# Patient Record
Sex: MALE | Race: WHITE | Employment: FULL TIME | URBAN - NONMETROPOLITAN AREA
[De-identification: names, ages, dates, MRNs, and addresses within clinical notes are randomized per-mention and may not be internally consistent; named-entity substitution may affect disease eponyms.]

---

## 2018-06-28 ENCOUNTER — OFFICE VISIT (OUTPATIENT)
Dept: PRIMARY CARE CLINIC | Age: 58
End: 2018-06-28
Payer: COMMERCIAL

## 2018-06-28 VITALS
WEIGHT: 272 LBS | TEMPERATURE: 98.7 F | BODY MASS INDEX: 36.84 KG/M2 | OXYGEN SATURATION: 97 % | DIASTOLIC BLOOD PRESSURE: 100 MMHG | SYSTOLIC BLOOD PRESSURE: 150 MMHG | HEIGHT: 72 IN | HEART RATE: 72 BPM

## 2018-06-28 DIAGNOSIS — K04.7 DENTAL ABSCESS: Primary | ICD-10-CM

## 2018-06-28 PROCEDURE — 99213 OFFICE O/P EST LOW 20 MIN: CPT | Performed by: NURSE PRACTITIONER

## 2018-06-28 RX ORDER — PENICILLIN V POTASSIUM 500 MG/1
500 TABLET ORAL 4 TIMES DAILY
Qty: 28 TABLET | Refills: 0 | Status: SHIPPED | OUTPATIENT
Start: 2018-06-28 | End: 2018-07-05

## 2018-06-28 ASSESSMENT — PATIENT HEALTH QUESTIONNAIRE - PHQ9
SUM OF ALL RESPONSES TO PHQ QUESTIONS 1-9: 0
1. LITTLE INTEREST OR PLEASURE IN DOING THINGS: 0
2. FEELING DOWN, DEPRESSED OR HOPELESS: 0
SUM OF ALL RESPONSES TO PHQ9 QUESTIONS 1 & 2: 0

## 2018-06-28 ASSESSMENT — ENCOUNTER SYMPTOMS
GASTROINTESTINAL NEGATIVE: 1
RESPIRATORY NEGATIVE: 1

## 2019-08-04 ENCOUNTER — APPOINTMENT (OUTPATIENT)
Dept: CT IMAGING | Age: 59
End: 2019-08-04
Payer: COMMERCIAL

## 2019-08-04 ENCOUNTER — HOSPITAL ENCOUNTER (EMERGENCY)
Age: 59
Discharge: HOME | End: 2019-08-04
Attending: EMERGENCY MEDICINE
Payer: COMMERCIAL

## 2019-08-04 VITALS — RESPIRATION RATE: 26 BRPM | OXYGEN SATURATION: 98 % | HEART RATE: 106 BPM

## 2019-08-04 DIAGNOSIS — S01.01XA LACERATION OF SCALP, INITIAL ENCOUNTER: Primary | ICD-10-CM

## 2019-08-04 DIAGNOSIS — S01.81XA FACIAL LACERATION, INITIAL ENCOUNTER: ICD-10-CM

## 2019-08-04 PROCEDURE — 70450 CT HEAD/BRAIN W/O DYE: CPT

## 2019-08-04 PROCEDURE — 99283 EMERGENCY DEPT VISIT LOW MDM: CPT

## 2019-08-04 PROCEDURE — 96372 THER/PROPH/DIAG INJ SC/IM: CPT

## 2019-08-04 PROCEDURE — 6360000002 HC RX W HCPCS: Performed by: EMERGENCY MEDICINE

## 2019-08-04 PROCEDURE — 12002 RPR S/N/AX/GEN/TRNK2.6-7.5CM: CPT

## 2019-08-04 RX ORDER — HALOPERIDOL 5 MG/ML
5 INJECTION INTRAMUSCULAR ONCE
Status: COMPLETED | OUTPATIENT
Start: 2019-08-04 | End: 2019-08-04

## 2019-08-04 RX ORDER — LORAZEPAM 2 MG/ML
1 INJECTION INTRAMUSCULAR ONCE
Status: COMPLETED | OUTPATIENT
Start: 2019-08-04 | End: 2019-08-04

## 2019-08-04 RX ORDER — HALOPERIDOL 5 MG/ML
10 INJECTION INTRAMUSCULAR ONCE
Status: COMPLETED | OUTPATIENT
Start: 2019-08-04 | End: 2019-08-04

## 2019-08-04 RX ADMIN — HALOPERIDOL LACTATE 5 MG: 5 INJECTION, SOLUTION INTRAMUSCULAR at 17:29

## 2019-08-04 RX ADMIN — HALOPERIDOL LACTATE 5 MG: 5 INJECTION, SOLUTION INTRAMUSCULAR at 17:40

## 2019-08-04 RX ADMIN — LORAZEPAM 1 MG: 2 INJECTION INTRAMUSCULAR; INTRAVENOUS at 17:22

## 2019-08-04 RX ADMIN — HALOPERIDOL LACTATE 5 MG: 5 INJECTION, SOLUTION INTRAMUSCULAR at 17:23

## 2019-08-04 NOTE — ED NOTES
Patient calmer and willing to have CT scan done if he can have ice water when done     Tod Mccallum RN  08/04/19 4614

## 2019-08-04 NOTE — ED NOTES
Patient continues to be resistant to care. Patient verbally threatening ED staff and police. Stating \"I won't be drunk forever\", \"I'm gonna get out of this bed\", \"I hope you have a will\". Verbal order received from Dr. Chepe Lawrence to administer additional 5mg of Haldol IM.      Russ Fairbanks RN  08/04/19 2022

## 2019-08-04 NOTE — ED NOTES
Given ice water as requested. Patient calmer and not verbally abusive as before.      Sylvester Chen RN  08/04/19 6577

## 2019-08-04 NOTE — ED PROVIDER NOTES
1 capsule by mouth daily. SERTRALINE (ZOLOFT) 100 MG TABLET    Take 1.5 tablets by mouth daily. SIMVASTATIN (ZOCOR) 40 MG TABLET    Take 1 tablet by mouth daily. VENLAFAXINE (EFFEXOR-XR) 75 MG XR CAPSULE    Take 1 capsule by mouth daily. ALLERGIES     has No Known Allergies. FAMILY HISTORY     He indicated that his mother is alive. He indicated that his father is . He indicated that only one of his two sisters is alive. He indicated that both of his brothers are alive. He indicated that his maternal grandmother is . He indicated that his maternal grandfather is . He indicated that his paternal grandmother is . He indicated that his paternal grandfather is . He indicated that his daughter is alive. He indicated that both of his sons are alive. family history includes Cancer in his sister; Coronary Art Dis in his father; Diabetes in his maternal grandmother; Glaucoma in his mother; Heart Attack in his father; Other in his father; Stroke in his maternal grandmother; Sudden Death in his sister. SOCIAL HISTORY      reports that he quit smoking about 7 years ago. His smoking use included cigarettes. He has a 24.75 pack-year smoking history. He has never used smokeless tobacco. He reports that he does not drink alcohol or use drugs. PHYSICAL EXAM     INITIAL VITALS:  pulse is 106. His respiration is 26 and oxygen saturation is 98%. Patient is alert and combative. HEENT:  3 cm lac to right parietal scalp. 1 cm lac to left forehead. Pupils are PERRL at 4 mm with normal extraocular motion. No nystagmus. Mucous membranes moist.    Neck is supple with no lymphadenopathy. No JVD. No meningismus. Heart sounds regular rate and rhythm with no gallops, murmurs, or rubs. Lungs clear, no wheezes, rales or rhonchi. Abdomen: soft, nontender. Musculoskeletal exam shows no evidence of trauma, other than to scalp.   Normal distal pulses in all extremities. Skin: no rash or edema. Neurological exam reveals cranial nerves 2 through 12 grossly intact. Patient has equal  and normal deep tendon reflexes. Psychiatric: no hallucinations or suicidal ideation. Lymphatics.:  No lymphadenopathy. DIFFERENTIAL DIAGNOSIS/ MDM:     Laceration, inebriation, ICH    DIAGNOSTIC RESULTS     RADIOLOGY:   I directly visualized the following  images and reviewed the radiologist interpretations:  CT Head WO Contrast   Final Result   Motion degradation of images. No acute intracranial abnormality. Right frontoparietal scalp staples. CT Head WO Contrast (Final result)   Result time 08/04/19 18:18:23   Final result by Altagracia Charles MD (08/04/19 18:18:23)                Impression:    Motion degradation of images. No acute intracranial abnormality. Right frontoparietal scalp staples. Narrative:    EXAMINATION:  CT OF THE HEAD WITHOUT CONTRAST  8/4/2019 5:57 pm    TECHNIQUE:  CT of the head was performed without the administration of intravenous  contrast. Dose modulation, iterative reconstruction, and/or weight based  adjustment of the mA/kV was utilized to reduce the radiation dose to as low  as reasonably achievable. COMPARISON:  None. HISTORY:  ORDERING SYSTEM PROVIDED HISTORY: trauma  TECHNOLOGIST PROVIDED HISTORY:    Reason for Exam: Patient banged head multiple times against  car windows  while being transferred. Laceration right top of head  Acuity: Acute  Type of Exam: Initial    FINDINGS:  There is motion degradation of images. BRAIN/VENTRICLES: There is no acute intracranial hemorrhage, mass effect or  midline shift.  No abnormal extra-axial fluid collection.  The gray-white  differentiation is maintained without evidence of an acute infarct.  There is  no evidence of hydrocephalus. ORBITS: The visualized portion of the orbits demonstrate no acute abnormality.     SINUSES: The visualized paranasal sinuses and mastoid air cells demonstrate  no acute abnormality. SOFT TISSUES/SKULL:  No acute abnormality of the visualized skull or soft  tissues.  Note is made of right frontal scalp staples. EMERGENCY DEPARTMENT COURSE:   Vitals:    Vitals:    08/04/19 1800   Pulse: 106   Resp: 26   SpO2: 98%     -------------------------   ,  , Pulse: 106, Resp: 26      Re-evaluation Notes    The patient received a total of 15 mg of Haldol and 1 mg of Ativan. After this, the patient was more docile and we were able to get a CT scan to clear him of intracerebral hemorrhage. The patient's wounds were cleansed and repaired. The patient is medically cleared for incarceration. He is discharged into the custody of police in stable condition. PROCEDURES:    laceration repair:  The patient's lacerations were cleansed with soap and water. They were explored. No deep structures were involved. The wound on the parietal scalp was closed with 6 staples. The wound on the forehead was closed with Dermabond. FINAL IMPRESSION      1. Laceration of scalp, initial encounter    2.  Facial laceration, initial encounter          DISPOSITION/PLAN   DISPOSITION        Condition on Disposition    good    PATIENT REFERRED TO:  Dixon Hassan MD  [address] [place] [address] Minoo Moody  [phone]    In 10 days  for staple removal      DISCHARGE MEDICATIONS:  New Prescriptions    No medications on file       (Please note that portions of this note were completed with a voice recognition program.  Efforts were made to edit the dictations but occasionally words are mis-transcribed.)    Crouch MD   Attending Emergency Physician         Piotr Velarde MD  08/04/19 2727

## 2021-04-22 RX ORDER — LANOLIN ALCOHOL/MO/W.PET/CERES
2 CREAM (GRAM) TOPICAL
COMMUNITY

## 2021-04-22 RX ORDER — TAMSULOSIN HYDROCHLORIDE 0.4 MG/1
0.4 CAPSULE ORAL EVERY EVENING
COMMUNITY

## 2021-04-22 RX ORDER — ASPIRIN 81 MG/1
81 TABLET ORAL
COMMUNITY

## 2021-04-22 RX ORDER — NAPROXEN 500 MG/1
500 TABLET ORAL 2 TIMES DAILY WITH MEALS
COMMUNITY
End: 2022-03-18

## 2021-04-22 RX ORDER — LORAZEPAM 0.5 MG/1
0.5 TABLET ORAL DAILY
COMMUNITY
End: 2021-05-24

## 2021-05-24 ENCOUNTER — HOSPITAL ENCOUNTER (OUTPATIENT)
Dept: GENERAL RADIOLOGY | Age: 61
Discharge: HOME | End: 2021-05-26
Payer: COMMERCIAL

## 2021-05-24 ENCOUNTER — HOSPITAL ENCOUNTER (OUTPATIENT)
Dept: NON INVASIVE DIAGNOSTICS | Age: 61
Discharge: HOME | End: 2021-05-24
Payer: COMMERCIAL

## 2021-05-24 ENCOUNTER — INITIAL CONSULT (OUTPATIENT)
Dept: SURGERY | Age: 61
End: 2021-05-24
Payer: COMMERCIAL

## 2021-05-24 ENCOUNTER — HOSPITAL ENCOUNTER (OUTPATIENT)
Dept: LAB | Age: 61
Discharge: HOME | End: 2021-05-24
Payer: COMMERCIAL

## 2021-05-24 VITALS
RESPIRATION RATE: 18 BRPM | SYSTOLIC BLOOD PRESSURE: 130 MMHG | TEMPERATURE: 98.5 F | HEIGHT: 72 IN | WEIGHT: 222.2 LBS | BODY MASS INDEX: 30.1 KG/M2 | DIASTOLIC BLOOD PRESSURE: 88 MMHG | HEART RATE: 88 BPM

## 2021-05-24 DIAGNOSIS — Z01.818 PRE-OP TESTING: ICD-10-CM

## 2021-05-24 DIAGNOSIS — K40.90 UNILATERAL INGUINAL HERNIA WITHOUT OBSTRUCTION OR GANGRENE, RECURRENCE NOT SPECIFIED: Primary | ICD-10-CM

## 2021-05-24 LAB
ABSOLUTE EOS #: 0.23 K/UL (ref 0–0.44)
ABSOLUTE IMMATURE GRANULOCYTE: 0.04 K/UL (ref 0–0.3)
ABSOLUTE LYMPH #: 2.71 K/UL (ref 1.1–3.7)
ABSOLUTE MONO #: 1.13 K/UL (ref 0.1–1.2)
ALBUMIN SERPL-MCNC: 4.7 G/DL (ref 3.5–5.2)
ALBUMIN/GLOBULIN RATIO: 1.8 (ref 1–2.5)
ALP BLD-CCNC: 45 U/L (ref 40–129)
ALT SERPL-CCNC: 18 U/L (ref 5–41)
ANION GAP SERPL CALCULATED.3IONS-SCNC: 8 MMOL/L (ref 9–17)
AST SERPL-CCNC: 32 U/L
BASOPHILS # BLD: 0 % (ref 0–2)
BASOPHILS ABSOLUTE: 0.05 K/UL (ref 0–0.2)
BILIRUB SERPL-MCNC: 0.49 MG/DL (ref 0.3–1.2)
BUN BLDV-MCNC: 21 MG/DL (ref 6–20)
BUN/CREAT BLD: 24 (ref 9–20)
CALCIUM SERPL-MCNC: 9.5 MG/DL (ref 8.6–10.4)
CHLORIDE BLD-SCNC: 105 MMOL/L (ref 98–107)
CO2: 28 MMOL/L (ref 20–31)
CREAT SERPL-MCNC: 0.86 MG/DL (ref 0.7–1.2)
DIFFERENTIAL TYPE: ABNORMAL
EKG ATRIAL RATE: 65 BPM
EKG P AXIS: 77 DEGREES
EKG P-R INTERVAL: 194 MS
EKG Q-T INTERVAL: 410 MS
EKG QRS DURATION: 118 MS
EKG QTC CALCULATION (BAZETT): 426 MS
EKG R AXIS: 52 DEGREES
EKG T AXIS: 45 DEGREES
EKG VENTRICULAR RATE: 65 BPM
EOSINOPHILS RELATIVE PERCENT: 2 % (ref 1–4)
GFR AFRICAN AMERICAN: >60 ML/MIN
GFR NON-AFRICAN AMERICAN: >60 ML/MIN
GFR SERPL CREATININE-BSD FRML MDRD: ABNORMAL ML/MIN/{1.73_M2}
GFR SERPL CREATININE-BSD FRML MDRD: ABNORMAL ML/MIN/{1.73_M2}
GLUCOSE BLD-MCNC: 84 MG/DL (ref 70–99)
HCT VFR BLD CALC: 45.1 % (ref 40.7–50.3)
HEMOGLOBIN: 14.8 G/DL (ref 13–17)
IMMATURE GRANULOCYTES: 0 %
LYMPHOCYTES # BLD: 22 % (ref 24–43)
MCH RBC QN AUTO: 30.6 PG (ref 25.2–33.5)
MCHC RBC AUTO-ENTMCNC: 32.8 G/DL (ref 25.2–33.5)
MCV RBC AUTO: 93.2 FL (ref 82.6–102.9)
MONOCYTES # BLD: 9 % (ref 3–12)
NRBC AUTOMATED: 0 PER 100 WBC
PDW BLD-RTO: 14 % (ref 11.8–14.4)
PLATELET # BLD: 246 K/UL (ref 138–453)
PLATELET ESTIMATE: ABNORMAL
PMV BLD AUTO: 10.5 FL (ref 8.1–13.5)
POTASSIUM SERPL-SCNC: 4.1 MMOL/L (ref 3.7–5.3)
RBC # BLD: 4.84 M/UL (ref 4.21–5.77)
RBC # BLD: ABNORMAL 10*6/UL
SEG NEUTROPHILS: 67 % (ref 36–65)
SEGMENTED NEUTROPHILS ABSOLUTE COUNT: 8.46 K/UL (ref 1.5–8.1)
SODIUM BLD-SCNC: 141 MMOL/L (ref 135–144)
TOTAL PROTEIN: 7.3 G/DL (ref 6.4–8.3)
WBC # BLD: 12.6 K/UL (ref 3.5–11.3)
WBC # BLD: ABNORMAL 10*3/UL

## 2021-05-24 PROCEDURE — 85025 COMPLETE CBC W/AUTO DIFF WBC: CPT

## 2021-05-24 PROCEDURE — 80053 COMPREHEN METABOLIC PANEL: CPT

## 2021-05-24 PROCEDURE — 36415 COLL VENOUS BLD VENIPUNCTURE: CPT

## 2021-05-24 PROCEDURE — 93005 ELECTROCARDIOGRAM TRACING: CPT

## 2021-05-24 PROCEDURE — 71046 X-RAY EXAM CHEST 2 VIEWS: CPT

## 2021-05-24 PROCEDURE — 99214 OFFICE O/P EST MOD 30 MIN: CPT | Performed by: SURGERY

## 2021-05-24 NOTE — PROGRESS NOTES
Types: Cigarettes     Quit date: 3/1/2012     Years since quittin.2    Smokeless tobacco: Never Used   Substance and Sexual Activity    Alcohol use: No    Drug use: No    Sexual activity: Not on file   Other Topics Concern    Not on file   Social History Narrative    Not on file     Social Determinants of Health     Financial Resource Strain:     Difficulty of Paying Living Expenses:    Food Insecurity:     Worried About Running Out of Food in the Last Year:     Ran Out of Food in the Last Year:    Transportation Needs:     Lack of Transportation (Medical):  Lack of Transportation (Non-Medical):    Physical Activity:     Days of Exercise per Week:     Minutes of Exercise per Session:    Stress:     Feeling of Stress :    Social Connections:     Frequency of Communication with Friends and Family:     Frequency of Social Gatherings with Friends and Family:     Attends Confucianist Services:     Active Member of Clubs or Organizations:     Attends Club or Organization Meetings:     Marital Status:    Intimate Partner Violence:     Fear of Current or Ex-Partner:     Emotionally Abused:     Physically Abused:     Sexually Abused:      Past Surgical History:   Procedure Laterality Date    COLONOSCOPY  2013    With EGD with biopsies and polypectomies showing severe esophagitis with ulceration, gastritis with antral erosions, hiatal hernia and small rectal polyp.  EYE SURGERY Left     KNEE ARTHROSCOPY Left     Meniscus repair, [address] [facility] [address] Gloria Jolly.  MEATOTOMY      For meatal stenosis.  UVULECTOMY      WISDOM TOOTH EXTRACTION       Past Medical History:   Diagnosis Date    Adult situational stress disorder     Anger     Issues.     Depression with anxiety     History of tobacco abuse     Hyperlipidemia     Hypertension     YESICA (obstructive sleep apnea)     cpap noncompliace    Osteoarthritis     back and knees    Suicide attempt [facility]) Age 26 Current Outpatient Medications on File Prior to Visit   Medication Sig Dispense Refill    aspirin 81 MG EC tablet Take 81 mg by mouth      glucosamine-chondroitin 500-400 MG tablet Take 2 tablets by mouth      tamsulosin (FLOMAX) 0.4 MG capsule Take 0.4 mg by mouth every evening      naproxen (NAPROSYN) 500 MG tablet Take 500 mg by mouth 2 times daily (with meals)      hydrochlorothiazide (HYDRODIURIL) 25 MG tablet TAKE ONE TABLET BY MOUTH ONCE DAILY 90 tablet 0    simvastatin (ZOCOR) 40 MG tablet Take 1 tablet by mouth daily. 90 tablet 1    lamoTRIgine (LAMICTAL) 25 MG tablet Take 1 tablet by mouth 2 times daily. 180 tablet 1    omeprazole (PRILOSEC) 40 MG capsule Take 1 capsule by mouth daily. 90 capsule 1    sertraline (ZOLOFT) 100 MG tablet Take 1.5 tablets by mouth daily. 135 tablet 1    venlafaxine (EFFEXOR-XR) 75 MG XR capsule Take 1 capsule by mouth daily. 90 capsule 1     No current facility-administered medications on file prior to visit. Allergies as of 05/24/2021    (No Known Allergies)     PHYSICAL EXAM:    Blood pressure 130/88, pulse 88, temperature 98.5 °F (36.9 °C), temperature source Tympanic, resp. rate 18, height 6' (1.829 m), weight 222 lb 3.2 oz (100.8 kg). Gen:  A and O x 3, NAD, well nourished  Eyes:  Sclera non icterus, PERRL  Lungs:  CTA, symmetrical  Chest:  RRR, no murmurs  Abd:  Soft, NT, ND, no HSM, no bruits  Hernia:   Patient has a moderate size hernia on the left inguinal area. It is reducible. It does go down into the upper scrotum. It feels like an indirect. There is no hernia on the right and there is no hernia at the umbilicus    ASSESS MENT:    1. Left ing hernia, reducible        PLAN:    1.   Lap ing hernia repair, TEP

## 2021-05-24 NOTE — LETTER
[facility]         Patient:John Kathy Hodgkin           :1960           Surgical/Procedure Planned: laparoscopic left inguinal hernia repair with mesh    Date & Location: 2021 at [facility]       Outpatient   Planned Length of OR: 1 hour    Sedation: general      Estimated Cardiac Risk for Non-Cardiac Surgery/Procedure     Low______ Moderate______ High______    Medication Instructions - Clarification needed by this date:       ASA 81 mg Hold ___ Days      Resume medications:     Lovenox indicated: _____Yes _____NO    Provider:Dr. Carolina Zelaya of Provider Giving Orders for Medication holds:    _____________________________________________

## 2021-06-07 ENCOUNTER — TELEPHONE (OUTPATIENT)
Dept: SURGERY | Age: 61
End: 2021-06-07

## 2021-07-01 ENCOUNTER — OFFICE VISIT (OUTPATIENT)
Dept: SURGERY | Age: 61
End: 2021-07-01
Payer: COMMERCIAL

## 2021-07-01 VITALS
HEART RATE: 68 BPM | BODY MASS INDEX: 31.29 KG/M2 | DIASTOLIC BLOOD PRESSURE: 60 MMHG | HEIGHT: 72 IN | SYSTOLIC BLOOD PRESSURE: 110 MMHG | TEMPERATURE: 98.7 F | WEIGHT: 231 LBS

## 2021-07-01 DIAGNOSIS — Z09 POSTOP CHECK: ICD-10-CM

## 2021-07-01 PROCEDURE — 99024 POSTOP FOLLOW-UP VISIT: CPT | Performed by: SURGERY

## 2022-02-08 ENCOUNTER — HOSPITAL ENCOUNTER (OUTPATIENT)
Dept: NEUROLOGY | Age: 62
Discharge: HOME | End: 2022-02-08
Payer: COMMERCIAL

## 2022-02-08 PROCEDURE — 95886 MUSC TEST DONE W/N TEST COMP: CPT

## 2022-02-08 PROCEDURE — 95912 NRV CNDJ TEST 11-12 STUDIES: CPT

## 2022-02-08 NOTE — PROGRESS NOTES
EMG/NCS Bilateral    lower Completed    PCP: RY Juarez    Ordering: RY Blair    Interpreting Physician: Charla Webb.  Juani Upton, Neurologist    Technician: Ryan Deleon RN

## 2022-02-08 NOTE — PROCEDURES
[address]                 [address] [place] [address]                        EMG/NERVE CONDUCTION STUDIES REPORT        PATIENT NAME: Daryle Stains                       :        1960  MED REC NO:   [id number]                             ROOM:  ACCOUNT NO:   [de-identified]                           ADMIT DATE: 2022    PROVIDER:     Ricky Carrion MD      DATE OF EM2022    REFERRING PROVIDER:  RY Torres      TECHNICAL SUMMARY:  The nature, purpose, goals, expectations and process  involved in the procedures of nerve conduction studies and needle  electromyography were reviewed, discussed, explained and verbal consent  was obtained from the patient. The patient's questions were answered  with reference to the above processes and procedures. There were no significant technical difficulties encountered during this  study and nerve conduction studies were performed under temperature  monitoring. CLINICAL DATA:        The patient is 63years old with history of tingling,  numbness, paresthesias involving both feet, legs, achiness involving the  lower extremities for the last 2 years. The patient also feels chronic  lower back pain. The patient has history of polyneuropathy for the last  4 to 5 years. The purpose of the study is to evaluate for mononeuropathy,  radiculopathy, peripheral polyneuropathy. SUMMARY:      The sensory nerve action potentials of the right and left  sural and superficial peroneal nerves were not recordable bilaterally. Compound muscle action potentials of the right and left peroneal and  tibial nerve shows low amplitude with borderline conduction velocities. Peroneal motor conduction studies recorded at tibialis anterior shows  borderline to normal amplitude, normal conduction velocities  bilaterally.     Proximal conductions as measured by the F responses were not recordable  in both peroneal and tibial nerves bilaterally. Tibial H responses were not recordable. Nerve  Conductions   Results  Were  Personally  Reviewed and  Analysed. Abnormal  Nerve  Conductions  Were  Personally  Repeated,  Verified, reconfirmed  And  Updated as needed  appropriately. Please    See   Wave  Forms   And    Details  Of     Nerve  Conduction   Studies   For  Additional  Information             Extensive   Needle  Electromyography  Was personally  Performed  In  Both      Lower  Extremities    In  The  Following  Muscles :      Gluteus  Medius,   Vastus  Lateralis,  Internal  Hamstring,    External  Hamstring,   Anterior Tibialis,  Medial  Gastrocnemius,           Extensive  Needle  Electromyography  Shows     A) Normal  insertional  Activity. There  Is  Absence  Of   P  Waves,  Fibrillations,  Fasciculations and        Other  Spontaneous   Activity. B) Voluntary  Motor unit  Potentials    Show :    Normal  Effort. Decreased   Recruitment, Increased amplitude &  Duration. Polyphasic features  Noted  In  The  Above  Examined  muscles           IMPRESSION:      1. There is electrodiagnostic evidence of moderate, sensory motor,        peripheral polyneuropathy involving examined both lower extremities. 2.  There is electrodiagnostic evidence of chronic L4-L5 and L5-S1       radiculopathy bilaterally. 3.  There is no definite electrodiagnostic evidence of inflammatory        myopathy involving examined both lower extremities. Further clinical correlation and followup recommended. Daniel Weir MD, Hector Michelle     Board Certified in Neurology  & in  [facility] ([facility]).            D: 02/08/2022 10:00:04       T: 02/08/2022 10:15:25     [id number]  Job#: [id number]     Doc#: [id number]    CC:  Jann Charles

## 2022-03-18 ENCOUNTER — OFFICE VISIT (OUTPATIENT)
Dept: NEUROSURGERY | Age: 62
End: 2022-03-18
Payer: COMMERCIAL

## 2022-03-18 VITALS
SYSTOLIC BLOOD PRESSURE: 116 MMHG | BODY MASS INDEX: 32.37 KG/M2 | HEART RATE: 72 BPM | WEIGHT: 239 LBS | DIASTOLIC BLOOD PRESSURE: 72 MMHG | RESPIRATION RATE: 16 BRPM | OXYGEN SATURATION: 96 % | HEIGHT: 72 IN

## 2022-03-18 DIAGNOSIS — G62.9 PERIPHERAL POLYNEUROPATHY: Primary | ICD-10-CM

## 2022-03-18 PROCEDURE — 99213 OFFICE O/P EST LOW 20 MIN: CPT | Performed by: NURSE PRACTITIONER

## 2022-03-18 PROCEDURE — 99203 OFFICE O/P NEW LOW 30 MIN: CPT | Performed by: NURSE PRACTITIONER

## 2022-03-18 NOTE — PROGRESS NOTES
[address]  [address] [facility]  [address] [facility], [address]  [facility] [address]  Dept: [phone]    Patient:  Cande Quezada  YOB: 1960  Date: 3/18/22    The patient is a 63 y.o. male who presents today for consult of the following problems:     Chief Complaint   Patient presents with    New Patient    Back Pain     lower, both legs tingle         HPI:     Cande Quezada is a 63 y.o. male on whom neurosurgical consultation was requested by RY Grullon for management of back and leg pain. Pt has had issues with pain, numbness and tingling to both legs and feet. This has been present for the last 5+ years. Painful numbness and tingling is present distally to lower extremities, anterior mid-calf into soles of feet, with involvement of all toes. Patient does have vascular discoloration to distal lower extremities, as well as known venous insufficiency. Follows with vascular specialist, regularly utilizes compression stockings which do help overall symptoms. Presents today for review of MRI as well as recent EMG. Groin pain: No  Saddle anesthesia: No  Hip Pain: No  Bowel/bladder incontinence: No  Constipation or Urinary retention: No    LIMITATIONS    Pain significantly limiting from a daily standpoint. Assistive devices: none  Daily pharmacologic pain control include: none  Back versus leg: all leg    MANAGEMENT     Prior Surgery: No  Prior to 1yr ago: In the last year:    Physical Therapy: Yes   Chiropractic Interventions: Yes   Injections: No;  Improvement: n/a    Types of injections/responses: n/a    History:     Past Medical History:   Diagnosis Date    Adult situational stress disorder     Anger     Issues.     Depression with anxiety     History of tobacco abuse     Hyperlipidemia     Hypertension     YESICA (obstructive sleep apnea)     cpap noncompliace    Osteoarthritis back and knees    Suicide attempt [facility]) Age 26     Past Surgical History:   Procedure Laterality Date    COLONOSCOPY  03/21/2013    With EGD with biopsies and polypectomies showing severe esophagitis with ulceration, gastritis with antral erosions, hiatal hernia and small rectal polyp.  EYE SURGERY Left     INGUINAL HERNIA REPAIR  06/16/2021    lap left ing [id number]    KNEE ARTHROSCOPY Left     Meniscus repair, [address] [facility], [address] Aime Gonzalez.  MEATOTOMY      For meatal stenosis.  UVULECTOMY      WISDOM TOOTH EXTRACTION       Family History   Problem Relation Age of Onset    Other Father         Paralyzed.  Heart Attack Father         Myocardial infarction.  Coronary Art Dis Father     Stroke Maternal Grandmother     Diabetes Maternal Grandmother     Cancer Sister         Unknown type.  Glaucoma Mother    Hernandez Sudden Death Sister         shot by  during divorce     Current Outpatient Medications on File Prior to Visit   Medication Sig Dispense Refill    aspirin 81 MG EC tablet Take 81 mg by mouth      glucosamine-chondroitin 500-400 MG tablet Take 2 tablets by mouth      tamsulosin (FLOMAX) 0.4 MG capsule Take 0.4 mg by mouth every evening      hydrochlorothiazide (HYDRODIURIL) 25 MG tablet TAKE ONE TABLET BY MOUTH ONCE DAILY 90 tablet 0    simvastatin (ZOCOR) 40 MG tablet Take 1 tablet by mouth daily. 90 tablet 1    lamoTRIgine (LAMICTAL) 25 MG tablet Take 1 tablet by mouth 2 times daily. 180 tablet 1    omeprazole (PRILOSEC) 40 MG capsule Take 1 capsule by mouth daily. 90 capsule 1    sertraline (ZOLOFT) 100 MG tablet Take 1.5 tablets by mouth daily. 135 tablet 1    venlafaxine (EFFEXOR-XR) 75 MG XR capsule Take 1 capsule by mouth daily. 90 capsule 1     No current facility-administered medications on file prior to visit.      Social History     Tobacco Use    Smoking status: Current Every Day Smoker     Packs/day: 1.00     Years: 40.00     Pack years: 40.00     Types: Cigarettes     Start date: 1/1/1982    Smokeless tobacco: Never Used   Substance Use Topics    Alcohol use: No    Drug use: No       No Known Allergies    Review of Systems  Constitutional: Negative for activity change and appetite change. HENT: Negative for ear pain and facial swelling. Eyes: Negative for discharge and itching. Respiratory: Negative for choking and chest tightness. Cardiovascular: Negative for chest pain and leg swelling. Gastrointestinal: Negative for nausea and abdominal pain. Endocrine: Negative for cold intolerance and heat intolerance. Genitourinary: Negative for frequency and flank pain. Musculoskeletal: Negative for myalgias and joint swelling. Skin: Negative for rash and wound. Allergic/Immunologic: Negative for environmental allergies and food allergies. Hematological: Negative for adenopathy. Does not bruise/bleed easily. Psychiatric/Behavioral: Negative for self-injury. The patient is not nervous/anxious. Physical Exam:      /72 (Site: Left Upper Arm, Position: Sitting, Cuff Size: Large Adult)   Pulse 72   Resp 16   Ht 6' (1.829 m)   Wt 239 lb (108.4 kg)   SpO2 96%   BMI 32.41 kg/m²   Estimated body mass index is 32.41 kg/m² as calculated from the following:    Height as of this encounter: 6' (1.829 m). Weight as of this encounter: 239 lb (108.4 kg). General:  Gavin Lee is a 63y.o. year old male who appears his stated age. HEENT: Normocephalic atraumatic. Neck supple. Chest: regular rate; pulses equal  Abdomen: Soft nontender nondistended.    Ext: DP and PT pulses 2+, good cap refill  Neuro    Mentation  Appropriate affect  Registration intact  Orientation intact    Cranial Nerves:   Pupils equal and reactive to light  Extraocular motion intact  Face and shrug symmetric  Tongue midline  No dysarthria  v1-3 sensation symmetric, masseter tone symmetric  Hearing symmetric and intact    Sensation: Decreased sensation bilateral shins and feet    Motor  L deltoid 5/5; R deltoid 5/5  L biceps 5/5; R biceps 5/5  L triceps 5/5; R triceps 5/5  L wrist extension 5/5; R wrist extension 5/5  L intrinsics 5/5; R intrinsics 5/5     L iliopsoas 5/5 , R iliopsoas 5/5  L quadriceps 5/5; R quadriceps 5/5  L Dorsiflexion 5/5; R dorsiflexion 5/5  L Plantarflexion 5/5; R plantarflexion 5/5  L EHL 5/5; R EHL 5/5    Reflexes  L Brachioradialis 2+/4; R brachioradialis 2+/4  L Biceps 2+/4; R Biceps 2+/4  L Triceps 2+/4; R Triceps 2+/4  L Patellar 2+/4: R Patellar 2+/4  L Achilles 2+/4; R Achilles 2+/4    hoffmans L: neg  hoffmans R: neg  Clonus L: neg  Clonus R: neg  Babinski L: neg  Babinski R: neg    YASH: Negative  Straight leg raise: Negative  SI joint tenderness: Negative    Studies Review:     EMG 2/8/2022:  IMPRESSION:    1. There is electrodiagnostic evidence of moderate, sensory motor,         peripheral polyneuropathy involving examined both lower extremities.     2. There is electrodiagnostic evidence of chronic L4-L5 and L5-S1        radiculopathy bilaterally.     3. There is no definite electrodiagnostic evidence of inflammatory         myopathy involving examined both lower extremities. Vascular surgery notes reviewed    Assessment and Plan:      1. Peripheral polyneuropathy          Plan: Symptoms most consistent with polyneuropathy. Recommend evaluation by neurology. Continue to follow with vascular specialist as well, use of compression stockings and elevation as recommended. Follow-up after neurology eval.    Followup: Return in about 3 months (around 6/18/2022), or if symptoms worsen or fail to improve. Prescriptions Ordered:  No orders of the defined types were placed in this encounter.        Orders Placed:  Orders Placed This Encounter   Procedures   Jose Francisco Salas MD, Neurology, [place]     Referral Priority:   Routine     Referral Type:   Eval and Treat     Referral Reason:   Specialty Services Required     Referred to Provider:   Eve Blanton MD     Requested Specialty:   Neurology     Number of Visits Requested:   1        Electronically signed by ONEYDA Olvera CNP on 3/18/2022 at 11:15 AM    Please note that this chart was generated using voice recognition Dragon dictation software. Although every effort was made to ensure the accuracy of this automated transcription, some errors in transcription may have occurred.

## 2022-05-09 ENCOUNTER — HOSPITAL ENCOUNTER (OUTPATIENT)
Dept: GENERAL RADIOLOGY | Age: 62
Discharge: HOME | End: 2022-05-11
Payer: COMMERCIAL

## 2022-05-09 ENCOUNTER — HOSPITAL ENCOUNTER (OUTPATIENT)
Dept: LAB | Age: 62
Discharge: HOME | End: 2022-05-09
Payer: COMMERCIAL

## 2022-05-09 ENCOUNTER — OFFICE VISIT (OUTPATIENT)
Dept: NEUROLOGY | Age: 62
End: 2022-05-09
Payer: COMMERCIAL

## 2022-05-09 VITALS
BODY MASS INDEX: 31.56 KG/M2 | OXYGEN SATURATION: 97 % | HEART RATE: 100 BPM | DIASTOLIC BLOOD PRESSURE: 84 MMHG | HEIGHT: 72 IN | WEIGHT: 233 LBS | SYSTOLIC BLOOD PRESSURE: 126 MMHG

## 2022-05-09 DIAGNOSIS — M79.2 NEUROPATHIC PAIN: ICD-10-CM

## 2022-05-09 DIAGNOSIS — E78.2 MIXED HYPERLIPIDEMIA: ICD-10-CM

## 2022-05-09 DIAGNOSIS — R27.8 SENSORY ATAXIA: ICD-10-CM

## 2022-05-09 DIAGNOSIS — F41.8 DEPRESSION WITH ANXIETY: ICD-10-CM

## 2022-05-09 DIAGNOSIS — Z91.81 H/O FALL: ICD-10-CM

## 2022-05-09 DIAGNOSIS — R20.0 NUMBNESS AND TINGLING OF BOTH LEGS BELOW KNEES: ICD-10-CM

## 2022-05-09 DIAGNOSIS — I10 PRIMARY HYPERTENSION: ICD-10-CM

## 2022-05-09 DIAGNOSIS — R20.2 NUMBNESS AND TINGLING OF BOTH LEGS BELOW KNEES: ICD-10-CM

## 2022-05-09 DIAGNOSIS — F17.200 SMOKER: ICD-10-CM

## 2022-05-09 DIAGNOSIS — G60.9 IDIOPATHIC PERIPHERAL NEUROPATHY: Primary | ICD-10-CM

## 2022-05-09 DIAGNOSIS — J44.9 CHRONIC OBSTRUCTIVE PULMONARY DISEASE, UNSPECIFIED COPD TYPE (HCC): ICD-10-CM

## 2022-05-09 DIAGNOSIS — G60.9 IDIOPATHIC PERIPHERAL NEUROPATHY: ICD-10-CM

## 2022-05-09 LAB
ALBUMIN SERPL-MCNC: 4.8 G/DL (ref 3.5–5.2)
ALBUMIN/GLOBULIN RATIO: 1.7 (ref 1–2.5)
ALP BLD-CCNC: 55 U/L (ref 40–129)
ALT SERPL-CCNC: 16 U/L (ref 5–41)
ANION GAP SERPL CALCULATED.3IONS-SCNC: 11 MMOL/L (ref 9–17)
AST SERPL-CCNC: 25 U/L
BILIRUB SERPL-MCNC: 0.51 MG/DL (ref 0.3–1.2)
BUN BLDV-MCNC: 16 MG/DL (ref 6–20)
BUN/CREAT BLD: 20 (ref 9–20)
CALCIUM SERPL-MCNC: 9.5 MG/DL (ref 8.6–10.4)
CHLORIDE BLD-SCNC: 104 MMOL/L (ref 98–107)
CO2: 27 MMOL/L (ref 20–31)
CREAT SERPL-MCNC: 0.81 MG/DL (ref 0.7–1.2)
FOLATE: >20 NG/ML
GFR AFRICAN AMERICAN: >60 ML/MIN
GFR NON-AFRICAN AMERICAN: >60 ML/MIN
GFR SERPL CREATININE-BSD FRML MDRD: ABNORMAL ML/MIN/{1.73_M2}
GLUCOSE BLD-MCNC: 104 MG/DL (ref 70–99)
HCT VFR BLD CALC: 48.2 % (ref 40.7–50.3)
HEMOGLOBIN: 16.1 G/DL (ref 13–17)
MCH RBC QN AUTO: 30.7 PG (ref 25.2–33.5)
MCHC RBC AUTO-ENTMCNC: 33.4 G/DL (ref 25.2–33.5)
MCV RBC AUTO: 92 FL (ref 82.6–102.9)
NRBC AUTOMATED: 0 PER 100 WBC
PDW BLD-RTO: 14.4 % (ref 11.8–14.4)
PLATELET # BLD: 242 K/UL (ref 138–453)
PMV BLD AUTO: 10.3 FL (ref 8.1–13.5)
POTASSIUM SERPL-SCNC: 3.8 MMOL/L (ref 3.7–5.3)
RBC # BLD: 5.24 M/UL (ref 4.21–5.77)
SODIUM BLD-SCNC: 142 MMOL/L (ref 135–144)
T. PALLIDUM, IGG: NONREACTIVE
TOTAL PROTEIN: 7.6 G/DL (ref 6.4–8.3)
TSH SERPL DL<=0.05 MIU/L-ACNC: 3.68 UIU/ML (ref 0.3–5)
VITAMIN B-12: 1225 PG/ML (ref 232–1245)
WBC # BLD: 8.8 K/UL (ref 3.5–11.3)

## 2022-05-09 PROCEDURE — 85027 COMPLETE CBC AUTOMATED: CPT

## 2022-05-09 PROCEDURE — 86780 TREPONEMA PALLIDUM: CPT

## 2022-05-09 PROCEDURE — 36415 COLL VENOUS BLD VENIPUNCTURE: CPT

## 2022-05-09 PROCEDURE — 99205 OFFICE O/P NEW HI 60 MIN: CPT | Performed by: PSYCHIATRY & NEUROLOGY

## 2022-05-09 PROCEDURE — 84165 PROTEIN E-PHORESIS SERUM: CPT

## 2022-05-09 PROCEDURE — 83516 IMMUNOASSAY NONANTIBODY: CPT

## 2022-05-09 PROCEDURE — 84155 ASSAY OF PROTEIN SERUM: CPT

## 2022-05-09 PROCEDURE — 86255 FLUORESCENT ANTIBODY SCREEN: CPT

## 2022-05-09 PROCEDURE — 80053 COMPREHEN METABOLIC PANEL: CPT

## 2022-05-09 PROCEDURE — 82746 ASSAY OF FOLIC ACID SERUM: CPT

## 2022-05-09 PROCEDURE — 71046 X-RAY EXAM CHEST 2 VIEWS: CPT

## 2022-05-09 PROCEDURE — 84443 ASSAY THYROID STIM HORMONE: CPT

## 2022-05-09 PROCEDURE — 82607 VITAMIN B-12: CPT

## 2022-05-09 RX ORDER — OXCARBAZEPINE 150 MG/1
TABLET, FILM COATED ORAL
Qty: 60 TABLET | Refills: 0 | Status: SHIPPED | OUTPATIENT
Start: 2022-05-09 | End: 2022-06-20 | Stop reason: SDUPTHER

## 2022-05-09 ASSESSMENT — ENCOUNTER SYMPTOMS
CHOKING: 0
ABDOMINAL PAIN: 0
PHOTOPHOBIA: 0
SINUS PRESSURE: 0
NAUSEA: 0
DIARRHEA: 0
EYE REDNESS: 0
COUGH: 0
CHEST TIGHTNESS: 0
WHEEZING: 0
ABDOMINAL DISTENTION: 0
BLOOD IN STOOL: 0
COLOR CHANGE: 0
VOICE CHANGE: 0
VOMITING: 0
VISUAL CHANGE: 0
EYE ITCHING: 0
EYE PAIN: 0
BACK PAIN: 1
TROUBLE SWALLOWING: 0
SORE THROAT: 0
SHORTNESS OF BREATH: 0
CONSTIPATION: 0
FACIAL SWELLING: 0
APNEA: 0
EYE DISCHARGE: 0

## 2022-05-09 NOTE — PROGRESS NOTES
[facility]  Neurology    [address] [place] [address]  SNXFD:[phone]   Fax: [phone]        SUBJECTIVE:       PATIENT ID:  Pablo Boogie is a  RIGHT    HANDED 63 y.o. male. Neurologic Problem  The patient's primary symptoms include clumsiness, focal sensory loss and a loss of balance. The patient's pertinent negatives include no focal weakness, memory loss, near-syncope, syncope, visual change or weakness. Primary symptoms comment: PERIPHERAL  POLYNEUROPATHY  AND  CHRONIC   LUMBAR  PAIN    . This is a chronic problem. The neurological problem developed insidiously. Associated symptoms include back pain and light-headedness. Pertinent negatives include no abdominal pain, chest pain, confusion, dizziness, fatigue, fever, headaches, nausea, neck pain, palpitations, shortness of breath or vomiting. Past treatments include bed rest and sleep. The treatment provided no relief. His past medical history is significant for mood changes. There is no history of a bleeding disorder, a clotting disorder, a CVA, dementia, head trauma, liver disease or seizures. History obtained from  The   PATIENT         and other  available   medical  records   were  Also  reviewed. The  Duration,  Quality,  Severity,  Location,  Timing,  Context,  Modifying  Factors   Of   The   Chief   Complaint       And  Present  Illness  Was   Reviewed   In   Chronological   Manner. PATIENT'S  MAIN  CONCERNS INCLUDE :                     1)     H/O   CHRONIC  PERIPHERAL  POLYNEUROPATHY                                     BOTH LOWER  EXTREMITIES    FOR  MORE   THAN    3   YEARS                        2)    NO    H/O    DM.                            3)   PREVIOUS      H/O      ALCOHOL    ABUSE                                        QUIT  ALCOHOL   FOR   3   YEARS                 4)    H/O     CHRONIC  SMOKING                    PATIENT  AWARE  OF RISKS  AND                 SIDE  EFFECTS  OF   SMOKING   DISCUSSED. PATIENT   ADVISED   AND  COUNSELED    TO   QUIT  SMOKING. 5)      H/O   CHRONIC  ANXIETY,  DEPRESSION                                              FOR    5 - 6    YEARS                            -  ON LAMICTAL,  ZOLOFT ,   EFFEXOR                    6)     CO   MORBID  MEDICAL  CONDITIONS                              BEING  FOLLOWED  BY   HIS PCP                       7)       PATIENT    FACTORY     WORKER       FOR    MORE  THAN   30   YEARS                               INVOLVING  PROLONGED   STANDING                      8)      H/O   CHRONIC  LUMBAR  PAIN    FOR    10    YEARS                        9)     EMG /  NC  STUDIES  IN   FEB. 2022     SHOWED                           A)   MODERATE  PERIPHERAL  POLYNEUROPATHY                          B)    CHRONIC  LOWER  LUMBAR  RADICULOPATHY                                              10)     PREVIOUS      H/O    FALL    -   UN EXPLAINED                        11)      SENSORY   ATAXIA                            12)    AS   DISCUSSED    WITH  PATIENT                               PATIENT  WILL   BE  TRIED  WITH    TRILEPTAL                              EXPECTATIONS,   GOALS   AND  SIDE  EFFECTS  MEDICATION    WERE                                 REVIEWED     AND   DISCUSSED    IN    DETAIL. 13)      IN  VIEW  OF  THE  PATIENT'S    MULTIPLE   NEUROLOGICAL                           SYMPTOMS  AND  CONCERNS    FOR  PROLONGED   DURATION,                           AND    MULTIPLE   CO MORBID  MEDICAL  CONDITIONS,                           PATIENT    NEEDS  NEURO  DIAGNOSTIC  EVALUATIONS                      FOR   ANY   NEUROLOGICAL  PATHOLOGIES    AND  OTHER                        CORRECTABLE   ETIOLOGIES;     AND                              PATIENT  REQUESTS   THE  SAME. 14)      VARIOUS  RISK   FACTORS   WERE  REVIEWED   AND   DISCUSSED. PATIENT   HAS  MULTIPLE   MEDICAL, NEUROLOGICAL                        AND   MENTAL HEALTH   PROBLEMS .                                                PRECIPITATING  FACTORS: including  fever/infection, exertion/relaxation, position change, stress,                weather change,   medications/alcohol, time of day/darkness/light  Are  present                                                          MODIFYING  FACTORS:  fever/infection, exertion/relaxation, position change, stress, weather change,               medications/alcohol, time of day/darkness/light  Are  present                Patient   Indicates   The  Presence   And  The  Absence  Of  The  Following    Associated  And             Additional  Neurological    Symptoms:                                Balance  And coordination   problems  present           Gait problems     absent            Headaches      absent              Migraines           absent           Memory problemsabsent              Confusion        absent            Paresthesia   numbness          absent           Seizures  And  Starring  Episodes           absent           Syncope,  Near  syncopal episodes         absent           Speech   problems           absent             Swallowing   Problems      absent            Dizziness,  Light headedness           absent              Vertigo        absent             Generalized   Weakness    absent              focal  Weakness     absent             Tremors         absent              Sleep  Problems     absent             History  Of   Recent  Head  Injury     absent             History  Of   Recent  TIA     absent             History  Of   Recent    Stroke     absent             Neck  Pain   and   Neck muscle  Spasms  absent               Radiating  down   And   Weakness           absent            Lower back   Pain  And     Spasms  present Radiating    Down   And   Weakness          present                H/OFALLS        present               History  Of   Visual  Symptoms    absent                  Associated   Diplopia       absent                                               Also   Additional   Symptoms   Present    As  Documented    In   The   detailed                  Review  Of  Systems   And    Please   Refer   To    Them for   Additional    Information. Any components  That are either  Unobtainable  Or  Limited  In   HPI, ROS  And/or PFSH   Are                   Due   ToPatient's  Medical  Problems,  Clinical  Condition   and/or lack of                                 other    Alternate   resources. RECORDS   REVIEWED:    historical medical records           INFORMATION   REVIEWED:     MEDICAL   HISTORY,SURGICAL   HISTORY,     MEDICATIONS   LIST,   ALLERGIES AND  DRUG  INTOLERANCES,       FAMILY   HISTORY,  SOCIAL  HISTORY,      PROBLEM  LIST   FOR  PATIENT  CARE   COORDINATION          Past Medical History:   Diagnosis Date    Adult situational stress disorder     Anger     Issues.  Depression with anxiety     History of tobacco abuse     Hyperlipidemia     Hypertension     YESICA (obstructive sleep apnea)     cpap noncompliace    Osteoarthritis     back and knees    Suicide attempt [facility] Age 26         Past Surgical History:   Procedure Laterality Date    COLONOSCOPY  03/21/2013    With EGD with biopsies and polypectomies showing severe esophagitis with ulceration, gastritis with antral erosions, hiatal hernia and small rectal polyp.  EYE SURGERY Left     INGUINAL HERNIA REPAIR  06/16/2021    lap left ing [id number]    KNEE ARTHROSCOPY Left     Meniscus repair, [address], Dr. Violet Oswald.  MEATOTOMY      For meatal stenosis.     UVULECTOMY      WISDOM TOOTH EXTRACTION           Current Outpatient Medications   Medication Sig Dispense Refill    OXcarbazepine (TRILEPTAL) 150 MG tablet ONE  TABLET      AT  BED   TIME   FOR     10   DAYS,     THEN      ONE  TABLET  TWICE  DAILY 60 tablet 0    aspirin 81 MG EC tablet Take 81 mg by mouth      glucosamine-chondroitin 500-400 MG tablet Take 2 tablets by mouth      tamsulosin (FLOMAX) 0.4 MG capsule Take 0.4 mg by mouth every evening      hydrochlorothiazide (HYDRODIURIL) 25 MG tablet TAKE ONE TABLET BY MOUTH ONCE DAILY 90 tablet 0    simvastatin (ZOCOR) 40 MG tablet Take 1 tablet by mouth daily. 90 tablet 1    lamoTRIgine (LAMICTAL) 25 MG tablet Take 1 tablet by mouth 2 times daily. 180 tablet 1    omeprazole (PRILOSEC) 40 MG capsule Take 1 capsule by mouth daily. 90 capsule 1    sertraline (ZOLOFT) 100 MG tablet Take 1.5 tablets by mouth daily. 135 tablet 1    venlafaxine (EFFEXOR-XR) 75 MG XR capsule Take 1 capsule by mouth daily. 90 capsule 1     No current facility-administered medications for this visit. No Known Allergies      Family History   Problem Relation Age of Onset    Other Father         Paralyzed.  Heart Attack Father         Myocardial infarction.  Coronary Art Dis Father     Stroke Maternal Grandmother     Diabetes Maternal Grandmother     Cancer Sister         Unknown type.     Glaucoma Mother    Kortney Maurer Sudden Death Sister         shot by  during divorce         Social History     Socioeconomic History    Marital status:      Spouse name: Not on file    Number of children: Not on file    Years of education: Not on file    Highest education level: Not on file   Occupational History    Not on file   Tobacco Use    Smoking status: Current Every Day Smoker     Packs/day: 1.00     Years: 40.00     Pack years: 40.00     Types: Cigarettes     Start date: 1/1/1982    Smokeless tobacco: Never Used   Substance and Sexual Activity    Alcohol use: No    Drug use: No    Sexual activity: Not on file   Other Topics Concern    Not on file   Social History Narrative    Not on file     Social Determinants of Health     Financial Resource Strain:     Difficulty of Paying Living Expenses: Not on file   Food Insecurity:     Worried About Running Out of Food in the Last Year: Not on file    Belkys of Food in the Last Year: Not on file   Transportation Needs:     Lack of Transportation (Medical): Not on file    Lack of Transportation (Non-Medical):  Not on file   Physical Activity:     Days of Exercise per Week: Not on file    Minutes of Exercise per Session: Not on file   Stress:     Feeling of Stress : Not on file   Social Connections:     Frequency of Communication with Friends and Family: Not on file    Frequency of Social Gatherings with Friends and Family: Not on file    Attends Oriental orthodox Services: Not on file    Active Member of [address] [place] [address] [employer] or Organizations: Not on file    Attends Club or Organization Meetings: Not on file    Marital Status: Not on file   Intimate Partner Violence:     Fear of Current or Ex-Partner: Not on file    Emotionally Abused: Not on file    Physically Abused: Not on file    Sexually Abused: Not on file   Housing Stability:     Unable to Pay for Housing in the Last Year: Not on file    Number of [place] in the Last Year: Not on file    Unstable Housing in the Last Year: Not on file       Vitals:    05/09/22 0946   BP: 126/84   Pulse: 100   SpO2: 97%         Wt Readings from Last 3 Encounters:   05/09/22 233 lb (105.7 kg)   03/18/22 239 lb (108.4 kg)   07/01/21 231 lb (104.8 kg)         BP Readings from Last 3 Encounters:   05/09/22 126/84   03/18/22 116/72   07/01/21 110/60           Hematology and Coagulation    Lab Results   Component Value Date    WBC 12.6 05/24/2021    RBC 4.84 05/24/2021    HGB 14.8 05/24/2021    HCT 45.1 05/24/2021    MCV 93.2 05/24/2021    MCH 30.6 05/24/2021    MCHC 32.8 05/24/2021    RDW 14.0 05/24/2021     05/24/2021    MPV 10.5 05/24/2021       No results found for: ESR    Chemistries    Lab Results Component Value Date     05/24/2021    K 4.1 05/24/2021     05/24/2021    CO2 28 05/24/2021    BUN 21 05/24/2021    CREATININE 0.86 05/24/2021    CALCIUM 9.5 05/24/2021    PROT 7.3 05/24/2021    LABALBU 4.7 05/24/2021    BILITOT 0.49 05/24/2021    ALKPHOS 45 05/24/2021    AST 32 05/24/2021    ALT 18 05/24/2021     Lab Results   Component Value Date    ALKPHOS 45 05/24/2021    ALT 18 05/24/2021    AST 32 05/24/2021    PROT 7.3 05/24/2021    BILITOT 0.49 05/24/2021    LABALBU 4.7 05/24/2021     Lab Results   Component Value Date    BUN 21 05/24/2021    CREATININE 0.86 05/24/2021     Lab Results   Component Value Date    CALCIUM 9.5 05/24/2021     Lab Results   Component Value Date    AST 32 05/24/2021    ALT 18 05/24/2021           Review of Systems   Constitutional: Negative for appetite change, chills, fatigue, fever and unexpected weight change. HENT: Negative for congestion, dental problem, drooling, ear discharge, ear pain, facial swelling, hearing loss, mouth sores, nosebleeds, postnasal drip, sinus pressure, sore throat, tinnitus, trouble swallowing and voice change. Eyes: Negative for photophobia, pain, discharge, redness, itching and visual disturbance. Respiratory: Negative for apnea, cough, choking, chest tightness, shortness of breath and wheezing. Cardiovascular: Negative for chest pain, palpitations, leg swelling and near-syncope. Gastrointestinal: Negative for abdominal distention, abdominal pain, blood in stool, constipation, diarrhea, nausea and vomiting. Endocrine: Negative for cold intolerance, heat intolerance, polydipsia, polyphagia and polyuria. Musculoskeletal: Positive for back pain. Negative for arthralgias, gait problem, joint swelling, myalgias, neck pain and neck stiffness. Skin: Negative for color change, pallor, rash and wound. Allergic/Immunologic: Negative for environmental allergies, food allergies and immunocompromised state.    Neurological: Positive for light-headedness and loss of balance. Negative for dizziness, tremors, focal weakness, seizures, syncope, facial asymmetry, speech difficulty, weakness, numbness and headaches. Hematological: Negative for adenopathy. Does not bruise/bleed easily. Psychiatric/Behavioral: Negative for agitation, behavioral problems, confusion, decreased concentration, dysphoric mood, hallucinations, memory loss, self-injury, sleep disturbance and suicidal ideas. The patient is nervous/anxious. The patient is not hyperactive. OBJECTIVE:      Physical Exam  Constitutional:       Appearance: He is well-developed. HENT:      Head: Normocephalic and atraumatic. No raccoon eyes or Reynoso's sign. Right Ear: External ear normal.      Left Ear: External ear normal.      Nose: Nose normal.   Eyes:      Conjunctiva/sclera: Conjunctivae normal.      Pupils: Pupils are equal, round, and reactive to light. Neck:      Thyroid: No thyroid mass or thyromegaly. Vascular: No carotid bruit. Trachea: No tracheal deviation. Meningeal: Brudzinski's sign and Kernig's sign absent. Cardiovascular:      Rate and Rhythm: Normal rate and regular rhythm. Pulmonary:      Effort: Pulmonary effort is normal.   Musculoskeletal:         General: No tenderness. Cervical back: Normal range of motion and neck supple. No rigidity. No muscular tenderness. Normal range of motion. Skin:     General: Skin is warm. Coloration: Skin is not pale. Findings: No erythema or rash. Nails: There is no clubbing. Psychiatric:         Attention and Perception: He is attentive. Mood and Affect: Mood is anxious. Mood is not depressed. Affect is not labile, blunt or inappropriate. Speech: He is communicative. Speech is not rapid and pressured, delayed, slurred or tangential.         Behavior: Behavior is not agitated, slowed, aggressive, withdrawn, hyperactive or combative. Behavior is cooperative. Thought Content: Thought content is not paranoid or delusional. Thought content does not include homicidal or suicidal ideation. Thought content does not include homicidal or suicidal plan. Cognition and Memory: Memory is not impaired. He does not exhibit impaired recent memory or impaired remote memory. Judgment: Judgment is not impulsive or inappropriate. NEUROLOGICALEXAMINATION :       A) MENTAL STATUS:                   Alert and  oriented  To time, place  And  Person. No Aphasia. No  Dysarthria. Able   To  Follow     SIMPLE    commands   without   Any  Difficulty. No right  To left confusion. Normal  Speech  And language function. Insight and  Judgment ,Fund  Of  Knowledge   within normal limits                Recent  And  Remote memory  within   normal limits                Attention &  Concentration are within   normal limits                                                   B) CRANIAL NERVES :        CN : Visual  Acuity  And  Visual fields  within normal limits               Fundi  Could  Not  Be  Could  Not  Be  Evaluated. 3,4,6 CN : Both  Pupils are   Reactive and  Equal.  Movements  Are  Intact. No  Nystagmus. No  ANTWAN. No  Afferent  Pupillary  Defect noted. 5 CN :  Normal  Facial sensations and Corneal  Reflexes           7 CN:  Normal  Facial  Symmetry  And  Strength. No facial  Weakness. 8 CN :  Hearing  Appears within normal limits          9, 10 CN: Normal   spontaneous, reflex   palate   movements         11 CN:   Normal  Shoulder  shrug and  strength         12 CN :   Normal  Tongue movements and  Tongue  In midline                        No tongue   Fasciculations or atrophy       C) MOTOR  EXAM:                 Strength  In upper  And  Lower   extremities   within   normal limits               No  Drift.      No Atrophy               Rapid   alternating  And  repetitions  Movements  within   normal limits                 Muscle  Tone  In upper  And  Lower  Extremities  normal                No rigidity. No  Spasticity. Bradykinesia   absent                 No  Asterixis. Sustention  Tremor , Resting   Tremor   absent                    No   other  Abnormal  Movements noted           D) SENSORY :               Light   touch, pinprick,   position  And  Vibration     DECREASED                          BELOW  KNEE  LEVELS   BILATERALLY        E) REFLEXES:                   Deep  Tendon  Reflexes   DECREASED                   No  pathological  Reflexes  Bilaterally. F) COORDINATION  AND  GAIT :                                Station and  Gait  normal                              Romberg 's test   POSITIVE                            Ataxia negative      ASSESSMENT:      Patient Active Problem List   Diagnosis    Hypertension    Hyperlipidemia    Depression with anxiety    YESICA (obstructive sleep apnea)    Osteoarthritis    Smoker    Idiopathic peripheral neuropathy    Numbness and tingling of both legs below knees    Neuropathic pain    Chronic obstructive pulmonary disease (HCC)    Sensory ataxia    H/O fall           VISITING DIAGNOSIS:      ICD-10-CM    1. Idiopathic peripheral neuropathy  G60.9 CBC     Comprehensive Metabolic Panel     TSH     Vitamin B12 & Folate     T. pallidum Ab     MISCELLANEOUS TESTING     Electrophoresis Protein, Serum   2. Depression with anxiety  F41.8    3. Mixed hyperlipidemia  E78.2    4. Primary hypertension  I10    5. Smoker  F17.200 XR CHEST STANDARD (2 VW)   6. Numbness and tingling of both legs below knees  R20.0 CBC    R20.2 Comprehensive Metabolic Panel     TSH     Vitamin B12 & Folate     T. pallidum Ab     MISCELLANEOUS TESTING     Electrophoresis Protein, Serum   7.  Neuropathic pain  M79.2 CBC     Comprehensive Metabolic Panel     TSH     Vitamin B12 & Folate     T. pallidum Ab     MISCELLANEOUS TESTING     Electrophoresis Protein, Serum   8. Chronic obstructive pulmonary disease, unspecified COPD type ([facility] [address]  J44.9    9. Sensory ataxia  R27.8    10. H/O fall  Z91.81                 CONCERNS   &   INCREASED   RISK   FOR         *  MONONEUROPATHIES,   RADICULOPATHY        *    BALANCE PROBLMES   AND  FALL                VARIOUS  RISK   FACTORS   WERE  REVIEWED   AND   DISCUSSED. *  PATIENT   HAS  MULTIPLE   MEDICAL, NEUROLOGICAL                        AND   MENTAL HEALTH   PROBLEMS . PATIENT'S   MANAGEMENT  IS  CHALLENGING. PLAN:                         Prashanth Mehta  Of  The  Diagnoses,  The  Management & Treatment  Options            AND    Care  plan  Were          Reviewed and   Discussed   With  patient. * Goals  And  Expectations  Of  The  Therapy  Discussed   And  Reviewed. *   Benefits   And   Side  Effect  Profile  Of  Medication/s   Were   Discussed                * Need   For  Further   Follow up For  The  Various  Problems Were  discussed. * Results  Of  The  Previous  Diagnostic tests were reviewed and  discussed                   Medical  Decision  Making  Was  Made  Based on the   Complexity  Of  Above  Mentioned  Diagnoses,    Data reviewed             And    Risk  Of  Significant   Co morbidities and   complicating   Factors. Medical  Decision  Was     High     Complexity   Due   To  The  Patient's  Multiple  Symptoms  &  Disease,            Complex  Treatment  Regimen,  Multiple medications           and   Risk  Of   Side  Effects,  Difficulty  In  Medication  Management  And  Diagnostic  Challenges           In  View  Of  The  Associated   Co  Morbid  Conditions   And  Problems. * FALL   PRECAUTIONS.       THESE  REVIEWED   AND  DISCUSSED      *    AVOID PROLONGED     STANDING      *     MAY   REST        AT    WORK AS   NEEDED          *   BE  CAREFUL  WITH  ACTIVITIES          *   AVOID   NECK  AND/ BACK  STRAINING  ACTIVITIES              *   ADEQUATE   FLUID  INTAKE   AND  ELECTROLYTE  BALANCE           * KEEP  DAIRY  OF   THE  NEUROLOGICAL  SYMPTOMS        RECORDING THE    DURATION  AND  FREQUENCY. *TO  MAINTAIN  REGULAR  SLEEP  WAKE  CYCLES. *   TO  HAVE  ADEQUATE  REST  AND   SLEEP    HOURS.              *    AVOID  ANY USAGE OF    TOBACCO,          AVOID  EXCESSIVE  ALCOHOL  AND   ILLEGAL   SUBSTANCES          *  CONTINUE   MEDICATIONS    PRESCRIBED       AS    RECOMMENDED       *   Compliance   With  Medications   And  Instructions          *    Antiplatelet  therapy    As   Recommended  Was   Discussed      *    Prophylactic  Use   Of     Vitamin   B   Complex,  Folic  Acid,    Vitamin  B12    Multivitamin,       Calcium  With  magnesium  And  Vit D    Supplementations   Over  The  Counter  Discussed             *FOOT  CARE, DAILY  INSPECTION  OF  FEET   AND         PERIODIC  PODIATRY EVALUATIONS . *   IN  VIEW  OF  THE  PATIENT'S    MULTIPLE   NEUROLOGICAL                           SYMPTOMS  AND  CONCERNS    FOR  PROLONGED   DURATION,                           AND    MULTIPLE   CO MORBID  MEDICAL  CONDITIONS,                           PATIENT    NEEDS  NEURO  DIAGNOSTIC  EVALUATIONS                      FOR   ANY   NEUROLOGICAL  PATHOLOGIES    AND  OTHER                        CORRECTABLE   ETIOLOGIES;     AND                              PATIENT  REQUESTS   THE  SAME. Controlled Substances Monitoring: Periodic Controlled Substance Monitoring: Possible medication side effects, risk of tolerance/dependence & alternative treatments discussed. ,Assessed functional status.  Renny Lezama MD)                          Orders Placed This Encounter   Procedures    XR CHEST STANDARD (2 VW)    CBC    Comprehensive Metabolic Panel    TSH    Vitamin B12 & Folate    T. pallidum Ab    MISCELLANEOUS TESTING    Electrophoresis Protein, Serum                             Orders Placed This Encounter   Medications    OXcarbazepine (TRILEPTAL) 150 MG tablet     Sig: ONE  TABLET      AT  BED   TIME   FOR     10   DAYS,     THEN      ONE  TABLET  TWICE  DAILY     Dispense:  60 tablet     Refill:  0                             *  PATIENT  TO  RETURN  THE  CLINIC  AFTER   ABOVE,                       FOR   FURTHER    RE EVALUATIONS                               AND  ADDITIONAL  RECOMMENDATIONS          *PATIENT   TO  FOLLOW  UP  WITH   PRIMARY  CARE         OTHER  CONSULTANTS  AS  BEFORE.               *TO  FOLLOW  WITH   MENTAL  HEALTH  PROFESSIONALS ,  INCLUDING            PSYCHOLOGICAL  COUNSELING   AND  PSYCHIATRIC  EVALUTIONS,                     *  Maintain   Healthy  Life Style    With   Periodic  Monitoring  Of          Any  Medical  Conditions  Including   Elevated  Blood  Pressure,  Lipid  Profile,        Blood  Sugar levels  AndHeart  Disease. *   Period   Screening  For  Cancers  Involving  Breast,  Colon,         Lungs  And  Other  Organs  As  Applicable,  In consultation   With  Your  Primary Care Providers. *Second  Neurological  Opinion  And  Evaluations  In  [facility]  Setting  If  Patient  Is  Interested. * Please   Contact   Neurology  Clinic   Early   If   Are  Any  New  Neurological   And  Any neurological  Concerns. *  If  The  Patient remains  Neurologically  Stable   Return   To  [facility] Neurology Department   IN      1-2        MONTHS  TIME   FOR  FURTHER              FOLLOW UP.                       *   The  Neurological   Findings,  Possible  Diagnosis,  Differential diagnoses                    And  Options  For    Further   Investigations                   And  management   Are  Discussed  Comprehensively. Medications   And  Prescription   Risks  And  Side effects  Are   Also  Discussed. *  If   There is  Any  Significant  Worsening   Of  Current  Symptoms  And  Or                  If patient  Develops   Any additional  New  NeurologicalSymptoms                  Or  Significant  Concerns   Should  Call  911 or  Go  To  Emergency  Department                  For  Further  Immediate  Evaluation and  management . The   Above  Were  Reviewed  With  PATIENT   and                          questions  Answered  In  Detail. TOTAL   TIME     SPENT :               On this date 5/9/2022 I have spent  60  minutes  reviewing previous notes, test results               and face to face time with the patient including     discussing the diagnosis and importance of compliance               with the treatment plan  as well as documenting on the day of the visit. Electronically signed by   Angel Luis Moffett M.D., Reggie Edwards. Board Certified in  Neurology &  In  [facility] ([facility]      DISCLAIMER:   Although every effort was made to ensure the accuracy of this  electronictranscription, some errors in transcription may have occurred. GENERAL PATIENT INSTRUCTIONS:      A Healthy Lifestyle: Care Instructions   Your Care Instructions   A healthy lifestyle can help you feel good, stay at ahealthy weight, and have plenty of energy for both work and play. A healthy lifestyle is something you can share with your whole family.  A healthy lifestyle also can lower your risk for serious health problems, such ashigh blood pressure, heart disease, and diabetes.  You can follow a few steps listed below to improve your health and the health of your family.  Follow-up careis a key part of your treatment and safety.  Be sure to make and go to all appointments, and call your doctor if you are having problems. Its also a good idea to know your test results and keep a list of the medicines you take.  How can you care for yourself at home?  Do not eat too much sugar, fat, or fast foods. You can still have dessert and treats nowand then. The goal is moderation.  Start small to improve your eating habits. Pay attention to portion sizes, drink less juice and soda pop, and eat more fruits and vegetables.  Eat a healthy amount of food. A 3-ounce serving of meat, for example, is about the size of a deck of cards. Fill the rest of your plate with vegetables and whole grains.  Limit theamount of soda and sports drinks you have every day. Drink more water when you are thirsty.  Eat at least 5 servings of fruits and vegetables every day. It may seem like a lot, but it is not hard to reach this goal. Aserving or helping is 1 piece of fruit, 1 cup of vegetables, or 2 cups of leafy, raw vegetables. Have an apple or some carrot sticks as an afternoon snack instead of a candy bar. Try to have fruits and/or vegetables at everymeal.   Make exercise part of your daily routine. You may want to start with simple activities, such as walking, bicycling, or slow swimming. Try carmen active 30 to 60 minutes every day. You do not need to do all 30 to 60 minutes all at once. For example, you can exercise 3 times a day for 10 or 20 minutes. Moderate exercise is safe for most people, but it is always agood idea to talk to your doctor before starting an exercise program.   Keep moving. [place] [employer] the lawn, work in the garden, or [employer]. Take the stairs instead of the elevator at work.  If you smoke, quit. Peoplewho smoke have an increased risk for heart attack, stroke, cancer, and other lung illnesses. Quitting is hard, but there are ways to boost your chance of quitting tobacco for good.  Use nicotine gum, patches, or lozenges.    Ask your doctor about stop-smoking programs and medicines.  Keep trying.  In addition to reducing your risk of diseases in the future, you will notice some benefits soon after you stop using tobacco. If you have shortness of breath or asthma symptoms, they will likely getbetter within a few weeks after you quit.  Limit how much alcohol you drink. Moderate amounts of alcohol (up to 2 drinks a day for men, 1drink a day for women) are okay. But drinking too much can lead to liver problems, high blood pressure, and other health problems.  health   If you have a family, there are many things you can do together to improve your health.  Eat meals together as a family as often as possible.  Eat healthy foods. This includes fruits, vegetables, lean meats and dairy, and whole grains.  Include your family in your fitness plan. Most peoplethink of activities such as jogging or tennis as the way to fitness, but there are many ways you and your family can be more active. Anything that makes you breathe hard and gets your heart pumping is exercise. Here are sometips:   Walk to do errands or to take your child to school or the bus.  Go for a family bike ride after dinner instead of watching TV.  Where can you learn more?  Go to[email or web address] and sign in to your [employer] account. Enter C484 in the Search HealthInformation box to learn more about \"A Healthy Lifestyle: Care Instructions. \"     If you do not have anaccount, please click on the \"Sign Up Now\" link.  Current as of: July 26, 2016   Content Version: 11.2   © 5716-4486 [employer]. Care instructions adapted under license by [facility] ([facility]). If you have questions about a medical condition or this instruction, always ask your healthcare professional. [email or web address] disclaims any warranty or liability for your use of this information.

## 2022-05-09 NOTE — PATIENT INSTRUCTIONS
* FALL   PRECAUTIONS. *   ADEQUATE   FLUID  INTAKE   AND  ELECTROLYTE  BALANCE             * KEEP  DAIRY  OF   THE  NEUROLOGICAL  SYMPTOMS          *  TO  MAINTAIN  REGULAR  SLEEP  WAKE  CYCLES. *   TO  HAVE  ADEQUATE  REST  AND   SLEEP    HOURS.          *    AVOID  USAGE OF   TOBACCO,  EXCESSIVE  ALCOHOL                AND   ILLEGAL   SUBSTANCES,  IF  ANY          *  Maintain   Healthy  Life Style    With   Periodic  Monitoring  Of         Any  Medical  Conditions  Including   Elevated  Blood  Pressure,  Lipid  Profile,       Blood  Sugar levels  And   Heart  Disease. *   Period   Screening  For  Cancers  Involving  Breast,  Colon,         Lungs  And  Other  Organs  As  Applicable,           In consultation   With  Your  Primary Care Providers. *  If   There is  Any  Significant  Worsening   Of  Current  Symptoms  And             Or  If    Any additional  New  Neurological  Symptoms  and          Significant  Concerns   Should  Call  911 or  Go  To  Emergency  Department            For  Further  Immediate  Evaluation. verbal cues/1 person assist

## 2022-05-10 LAB
ALBUMIN (CALCULATED): 4.9 G/DL (ref 3.2–5.2)
ALBUMIN PERCENT: 68 % (ref 45–65)
ALPHA 1 PERCENT: 2 % (ref 3–6)
ALPHA 2 PERCENT: 11 % (ref 6–13)
ALPHA-1-GLOBULIN: 0.2 G/DL (ref 0.1–0.4)
ALPHA-2-GLOBULIN: 0.8 G/DL (ref 0.5–0.9)
BETA GLOBULIN: 0.7 G/DL (ref 0.5–1.1)
BETA PERCENT: 10 % (ref 11–19)
GAMMA GLOBULIN %: 9 % (ref 9–20)
GAMMA GLOBULIN: 0.7 G/DL (ref 0.5–1.5)
PATHOLOGIST: ABNORMAL
PROTEIN ELECTROPHORESIS, SERUM: ABNORMAL
TOTAL PROT. SUM,%: 100 % (ref 98–102)
TOTAL PROT. SUM: 7.3 G/DL (ref 6.3–8.2)
TOTAL PROTEIN: 7.2 G/DL (ref 6.4–8.3)

## 2022-05-15 LAB
SEND OUT REPORT: NORMAL
TEST NAME: NORMAL

## 2022-06-20 ENCOUNTER — OFFICE VISIT (OUTPATIENT)
Dept: NEUROLOGY | Age: 62
End: 2022-06-20
Payer: COMMERCIAL

## 2022-06-20 VITALS
OXYGEN SATURATION: 99 % | HEIGHT: 72 IN | HEART RATE: 66 BPM | DIASTOLIC BLOOD PRESSURE: 70 MMHG | BODY MASS INDEX: 32.23 KG/M2 | WEIGHT: 238 LBS | SYSTOLIC BLOOD PRESSURE: 122 MMHG

## 2022-06-20 DIAGNOSIS — R27.8 SENSORY ATAXIA: ICD-10-CM

## 2022-06-20 DIAGNOSIS — M54.16 CHRONIC LUMBAR RADICULOPATHY: ICD-10-CM

## 2022-06-20 DIAGNOSIS — R20.2 NUMBNESS AND TINGLING OF BOTH LEGS BELOW KNEES: ICD-10-CM

## 2022-06-20 DIAGNOSIS — G60.9 IDIOPATHIC PERIPHERAL NEUROPATHY: Primary | ICD-10-CM

## 2022-06-20 DIAGNOSIS — G47.33 OSA (OBSTRUCTIVE SLEEP APNEA): ICD-10-CM

## 2022-06-20 DIAGNOSIS — M79.2 NEUROPATHIC PAIN: ICD-10-CM

## 2022-06-20 DIAGNOSIS — I10 PRIMARY HYPERTENSION: ICD-10-CM

## 2022-06-20 DIAGNOSIS — R20.0 NUMBNESS AND TINGLING OF BOTH LEGS BELOW KNEES: ICD-10-CM

## 2022-06-20 DIAGNOSIS — Z91.81 H/O FALL: ICD-10-CM

## 2022-06-20 PROCEDURE — 99214 OFFICE O/P EST MOD 30 MIN: CPT | Performed by: PSYCHIATRY & NEUROLOGY

## 2022-06-20 RX ORDER — MELOXICAM 15 MG/1
15 TABLET ORAL DAILY
Qty: 30 TABLET | Refills: 1 | Status: SHIPPED | OUTPATIENT
Start: 2022-06-20 | End: 2022-07-22 | Stop reason: SDUPTHER

## 2022-06-20 RX ORDER — OXCARBAZEPINE 150 MG/1
TABLET, FILM COATED ORAL
Qty: 120 TABLET | Refills: 2 | Status: SHIPPED | OUTPATIENT
Start: 2022-06-20 | End: 2022-07-22 | Stop reason: SDUPTHER

## 2022-06-20 ASSESSMENT — ENCOUNTER SYMPTOMS
PHOTOPHOBIA: 0
VISUAL CHANGE: 0
EYE PAIN: 0
APNEA: 0
EYE DISCHARGE: 0
ABDOMINAL PAIN: 0
CHEST TIGHTNESS: 0
BACK PAIN: 1
CONSTIPATION: 0
SHORTNESS OF BREATH: 0
SORE THROAT: 0
TROUBLE SWALLOWING: 0
DIARRHEA: 0
BLOOD IN STOOL: 0
ABDOMINAL DISTENTION: 0
SINUS PRESSURE: 0
VOICE CHANGE: 0
COUGH: 0
COLOR CHANGE: 0
FACIAL SWELLING: 0
CHOKING: 0
EYE ITCHING: 0
WHEEZING: 0
EYE REDNESS: 0
VOMITING: 0
NAUSEA: 0

## 2022-06-20 NOTE — PROGRESS NOTES
[facility]  Neurology    [address] [address]  QTVTK:[phone]   Fax: [phone]        SUBJECTIVE:       PATIENT ID:  Pio Calderón is a  RIGHT    HANDED 63 y.o. male. Neurologic Problem  The patient's primary symptoms include clumsiness, focal sensory loss and a loss of balance. The patient's pertinent negatives include no focal weakness, memory loss, near-syncope, syncope, visual change or weakness. Primary symptoms comment: PERIPHERAL  POLYNEUROPATHY  AND  CHRONIC   LUMBAR  PAIN    . This is a chronic problem. The neurological problem developed insidiously. Associated symptoms include back pain and light-headedness. Pertinent negatives include no abdominal pain, chest pain, confusion, dizziness, fatigue, fever, headaches, nausea, neck pain, palpitations, shortness of breath or vomiting. Past treatments include bed rest and sleep. The treatment provided no relief. His past medical history is significant for mood changes. There is no history of a bleeding disorder, a clotting disorder, a CVA, dementia, head trauma, liver disease or seizures. History obtained from  The   PATIENT         and other  available   medical  records   were  Also  reviewed. The  Duration,  Quality,  Severity,  Location,  Timing,  Context,  Modifying  Factors   Of   The   Chief   Complaint       And  Present  Illness  Was   Reviewed   In   Chronological   Manner. PATIENT'S  MAIN  CONCERNS INCLUDE :                     1)     H/O   CHRONIC  PERIPHERAL  POLYNEUROPATHY                                     BOTH LOWER  EXTREMITIES    FOR    4   YEARS                        2)    NO    H/O    DM.                                3)   PREVIOUS      H/O      ALCOHOL    ABUSE                                        QUIT  ALCOHOL   FOR   3   YEARS                 4)    H/O     CHRONIC  SMOKING                    PATIENT  AWARE  OF  RISKS  AND SIDE  EFFECTS  OF   SMOKING   DISCUSSED. PATIENT   ADVISED   AND  COUNSELED    TO   QUIT  SMOKING. 5)      H/O   CHRONIC  ANXIETY,  DEPRESSION                                              FOR    5 - 6    YEARS                            -  ON LAMICTAL,  ZOLOFT ,   EFFEXOR                    6)     CO   MORBID  MEDICAL  CONDITIONS                              BEING  FOLLOWED  BY   HIS PCP                       7)       PATIENT    FACTORY     WORKER       FOR    30   YEARS                               INVOLVING  PROLONGED   STANDING                      8)      H/O   CHRONIC  LUMBAR  PAIN    FOR    10    YEARS                        9)     EMG /  NC  STUDIES  IN   FEB. 2022     SHOWED                           A)   MODERATE  PERIPHERAL  POLYNEUROPATHY                          B)    CHRONIC  LOWER  LUMBAR  RADICULOPATHY                                              10)     PREVIOUS      H/O    FALL    -   UN EXPLAINED                        11)          SENSORY   ATAXIA                            12)      LABS   WORK  UP   FOR  NEUROPATHY     DID  NOT                                SHOW     ANY  SIGNIFICANT  ABNORMALITIES                                 CHEST  X RAY   SHOWED    NO    ACUTE PATHOLOGY                                  PATIENT STARTED    ON     TRILEPTAL   IN  MAY  2022                               DOSE    WILL  BE  UP  TITRATED      FOR  SYMPTOMATIC  RELIEF                                                        13)        PATIENT   REQUESTS    FURTHER  EVALUATIONS      FOR                                     CHRONIC    LUMBAR  RADICULAR  PAIN                                     RECOMMENDED:                                         A)    MRI  LUMBAR  SPINE                                        B)      MOBIC    15   MG  PO  DAILY                                        14)      VARIOUS  RISK   FACTORS   WERE  REVIEWED   AND   DISCUSSED. PATIENT   HAS  MULTIPLE   MEDICAL, NEUROLOGICAL                        AND   MENTAL HEALTH   PROBLEMS .                                                PRECIPITATING  FACTORS: including  fever/infection, exertion/relaxation, position change, stress,                weather change,   medications/alcohol, time of day/darkness/light  Are  present                                                          MODIFYING  FACTORS:  fever/infection, exertion/relaxation, position change, stress, weather change,               medications/alcohol, time of day/darkness/light  Are  present                Patient   Indicates   The  Presence   And  The  Absence  Of  The  Following    Associated  And             Additional  Neurological    Symptoms:                                Balance  And coordination   problems  present           Gait problems     absent            Headaches      absent              Migraines           absent           Memory problemsabsent              Confusion        absent            Paresthesia   numbness          absent           Seizures  And  Starring  Episodes           absent           Syncope,  Near  syncopal episodes         absent           Speech   problems           absent             Swallowing   Problems      absent            Dizziness,  Light headedness           absent              Vertigo        absent             Generalized   Weakness    absent              focal  Weakness     absent             Tremors         absent              Sleep  Problems     absent             History  Of   Recent  Head  Injury     absent             History  Of   Recent  TIA     absent             History  Of   Recent    Stroke     absent             Neck  Pain   and   Neck muscle  Spasms  absent               Radiating  down   And   Weakness           absent            Lower back   Pain  And     Spasms  present              Radiating    Down   And   Weakness          present                H/OFALLS        present History  Of   Visual  Symptoms    absent                  Associated   Diplopia       absent                                               Also   Additional   Symptoms   Present    As  Documented    In   The   detailed                  Review  Of  Systems   And    Please   Refer   To    Them for   Additional    Information. Any components  That are either  Unobtainable  Or  Limited  In   HPI, ROS  And/or PFSH   Are                   Due   ToPatient's  Medical  Problems,  Clinical  Condition   and/or lack of                                 other    Alternate   resources. RECORDS   REVIEWED:    historical medical records           INFORMATION   REVIEWED:     MEDICAL   HISTORY,SURGICAL   HISTORY,     MEDICATIONS   LIST,   ALLERGIES AND  DRUG  INTOLERANCES,       FAMILY   HISTORY,  SOCIAL  HISTORY,      PROBLEM  LIST   FOR  PATIENT  CARE   COORDINATION          Past Medical History:   Diagnosis Date    Adult situational stress disorder     Anger     Issues.  Depression with anxiety     History of tobacco abuse     Hyperlipidemia     Hypertension     YESICA (obstructive sleep apnea)     cpap noncompliace    Osteoarthritis     back and knees    Suicide attempt [facility]) Age 26         Past Surgical History:   Procedure Laterality Date    COLONOSCOPY  03/21/2013    With EGD with biopsies and polypectomies showing severe esophagitis with ulceration, gastritis with antral erosions, hiatal hernia and small rectal polyp.  EYE SURGERY Left     INGUINAL HERNIA REPAIR  06/16/2021    lap left ing [id number]    KNEE ARTHROSCOPY Left     Meniscus repair, [address] [facility] [facility] [address] Doris Johnson.  MEATOTOMY      For meatal stenosis.     UVULECTOMY      WISDOM TOOTH EXTRACTION           Current Outpatient Medications   Medication Sig Dispense Refill    OXcarbazepine (TRILEPTAL) 150 MG tablet TWO    TABLETS  TWICE  DAILY 120 tablet 2    meloxicam (MOBIC) 15 MG tablet Take 1 tablet by mouth daily 30 tablet 1    aspirin 81 MG EC tablet Take 81 mg by mouth      glucosamine-chondroitin 500-400 MG tablet Take 2 tablets by mouth      tamsulosin (FLOMAX) 0.4 MG capsule Take 0.4 mg by mouth every evening      hydrochlorothiazide (HYDRODIURIL) 25 MG tablet TAKE ONE TABLET BY MOUTH ONCE DAILY 90 tablet 0    simvastatin (ZOCOR) 40 MG tablet Take 1 tablet by mouth daily. 90 tablet 1    lamoTRIgine (LAMICTAL) 25 MG tablet Take 1 tablet by mouth 2 times daily. 180 tablet 1    omeprazole (PRILOSEC) 40 MG capsule Take 1 capsule by mouth daily. 90 capsule 1    sertraline (ZOLOFT) 100 MG tablet Take 1.5 tablets by mouth daily. 135 tablet 1    venlafaxine (EFFEXOR-XR) 75 MG XR capsule Take 1 capsule by mouth daily. 90 capsule 1     No current facility-administered medications for this visit. No Known Allergies      Family History   Problem Relation Age of Onset    Other Father         Paralyzed.  Heart Attack Father         Myocardial infarction.  Coronary Art Dis Father     Stroke Maternal Grandmother     Diabetes Maternal Grandmother     Cancer Sister         Unknown type.     Glaucoma Mother    [facility] Sudden Death Sister         shot by  during divorce         Social History     Socioeconomic History    Marital status:      Spouse name: Not on file    Number of children: Not on file    Years of education: Not on file    Highest education level: Not on file   Occupational History    Not on file   Tobacco Use    Smoking status: Current Every Day Smoker     Packs/day: 1.00     Years: 40.00     Pack years: 40.00     Types: Cigarettes     Start date: 1/1/1982    Smokeless tobacco: Never Used   Substance and Sexual Activity    Alcohol use: No    Drug use: No    Sexual activity: Not on file   Other Topics Concern    Not on file   Social History Narrative    Not on file     Social Determinants of Health     Financial Resource Strain:     Difficulty of Paying Living Expenses: Not on file   Food Insecurity:     Worried About Running Out of Food in the Last Year: Not on file    Ran Out of Food in the Last Year: Not on file   Transportation Needs:     Lack of Transportation (Medical): Not on file    Lack of Transportation (Non-Medical):  Not on file   Physical Activity:     Days of Exercise per Week: Not on file    Minutes of Exercise per Session: Not on file   Stress:     Feeling of Stress : Not on file   Social Connections:     Frequency of Communication with Friends and Family: Not on file    Frequency of Social Gatherings with Friends and Family: Not on file    Attends Holiness Services: Not on file    Active Member of [address] [place] [address] [employer] or Organizations: Not on file    Attends Club or Organization Meetings: Not on file    Marital Status: Not on file   Intimate Partner Violence:     Fear of Current or Ex-Partner: Not on file    Emotionally Abused: Not on file    Physically Abused: Not on file    Sexually Abused: Not on file   Housing Stability:     Unable to Pay for Housing in the Last Year: Not on file    Number of [place] in the Last Year: Not on file    Unstable Housing in the Last Year: Not on file       Vitals:    06/20/22 0904   BP: 122/70   Pulse: 66   SpO2: 99%         Wt Readings from Last 3 Encounters:   06/20/22 238 lb (108 kg)   05/09/22 233 lb (105.7 kg)   03/18/22 239 lb (108.4 kg)         BP Readings from Last 3 Encounters:   06/20/22 122/70   05/09/22 126/84   03/18/22 116/72           Hematology and Coagulation    Lab Results   Component Value Date    WBC 8.8 05/09/2022    RBC 5.24 05/09/2022    HGB 16.1 05/09/2022    HCT 48.2 05/09/2022    MCV 92.0 05/09/2022    MCH 30.7 05/09/2022    MCHC 33.4 05/09/2022    RDW 14.4 05/09/2022     05/09/2022    MPV 10.3 05/09/2022       No results found for: ESR    Chemistries    Lab Results   Component Value Date     05/09/2022    K 3.8 05/09/2022     05/09/2022    CO2 27 05/09/2022    BUN 16 05/09/2022    CREATININE 0.81 05/09/2022    CALCIUM 9.5 05/09/2022    PROT 7.2 05/09/2022    PROT 7.6 05/09/2022    LABALBU 4.8 05/09/2022    BILITOT 0.51 05/09/2022    ALKPHOS 55 05/09/2022    AST 25 05/09/2022    ALT 16 05/09/2022     Lab Results   Component Value Date    ALKPHOS 55 05/09/2022    ALT 16 05/09/2022    AST 25 05/09/2022    PROT 7.2 05/09/2022    PROT 7.6 05/09/2022    BILITOT 0.51 05/09/2022    LABALBU 4.8 05/09/2022     Lab Results   Component Value Date    BUN 16 05/09/2022    CREATININE 0.81 05/09/2022     Lab Results   Component Value Date    CALCIUM 9.5 05/09/2022     Lab Results   Component Value Date    AST 25 05/09/2022    ALT 16 05/09/2022           Review of Systems   Constitutional: Negative for appetite change, chills, fatigue, fever and unexpected weight change. HENT: Negative for congestion, dental problem, drooling, ear discharge, ear pain, facial swelling, hearing loss, mouth sores, nosebleeds, postnasal drip, sinus pressure, sore throat, tinnitus, trouble swallowing and voice change. Eyes: Negative for photophobia, pain, discharge, redness, itching and visual disturbance. Respiratory: Negative for apnea, cough, choking, chest tightness, shortness of breath and wheezing. Cardiovascular: Negative for chest pain, palpitations, leg swelling and near-syncope. Gastrointestinal: Negative for abdominal distention, abdominal pain, blood in stool, constipation, diarrhea, nausea and vomiting. Endocrine: Negative for cold intolerance, heat intolerance, polydipsia, polyphagia and polyuria. Musculoskeletal: Positive for back pain. Negative for arthralgias, gait problem, joint swelling, myalgias, neck pain and neck stiffness. Skin: Negative for color change, pallor, rash and wound. Allergic/Immunologic: Negative for environmental allergies, food allergies and immunocompromised state. Neurological: Positive for light-headedness and loss of balance. Negative for dizziness, tremors, focal weakness, seizures, syncope, facial asymmetry, speech difficulty, weakness, numbness and headaches. Hematological: Negative for adenopathy. Does not bruise/bleed easily. Psychiatric/Behavioral: Negative for agitation, behavioral problems, confusion, decreased concentration, dysphoric mood, hallucinations, memory loss, self-injury, sleep disturbance and suicidal ideas. The patient is nervous/anxious. The patient is not hyperactive. OBJECTIVE:      Physical Exam  Constitutional:       Appearance: He is well-developed. HENT:      Head: Normocephalic and atraumatic. No raccoon eyes or Reynoso's sign. Right Ear: External ear normal.      Left Ear: External ear normal.      Nose: Nose normal.   Eyes:      Conjunctiva/sclera: Conjunctivae normal.      Pupils: Pupils are equal, round, and reactive to light. Neck:      Thyroid: No thyroid mass or thyromegaly. Vascular: No carotid bruit. Trachea: No tracheal deviation. Meningeal: Brudzinski's sign and Kernig's sign absent. Cardiovascular:      Rate and Rhythm: Normal rate and regular rhythm. Pulmonary:      Effort: Pulmonary effort is normal.   Musculoskeletal:         General: No tenderness. Cervical back: Normal range of motion and neck supple. No rigidity. No muscular tenderness. Normal range of motion. Skin:     General: Skin is warm. Coloration: Skin is not pale. Findings: No erythema or rash. Nails: There is no clubbing. Psychiatric:         Attention and Perception: He is attentive. Mood and Affect: Mood is anxious. Mood is not depressed. Affect is not labile, blunt or inappropriate. Speech: He is communicative. Speech is not rapid and pressured, delayed, slurred or tangential.         Behavior: Behavior is not agitated, slowed, aggressive, withdrawn, hyperactive or combative. Behavior is cooperative. Thought Content:  Thought content is not paranoid or delusional. Thought content does not include homicidal or suicidal ideation. Thought content does not include homicidal or suicidal plan. Cognition and Memory: Memory is not impaired. He does not exhibit impaired recent memory or impaired remote memory. Judgment: Judgment is not impulsive or inappropriate. NEUROLOGICALEXAMINATION :       A) MENTAL STATUS:                   Alert and  oriented  To time, place  And  Person. No Aphasia. No  Dysarthria. Able   To  Follow     SIMPLE    commands   without   Any  Difficulty. No right  To left confusion. Normal  Speech  And language function. Insight and  Judgment ,Fund  Of  Knowledge   within normal limits                Recent  And  Remote memory  within   normal limits                Attention &  Concentration are within   normal limits                                                   B) CRANIAL NERVES :        CN : Visual  Acuity  And  Visual fields  within normal limits               Fundi  Could  Not  Be  Could  Not  Be  Evaluated. 3,4,6 CN : Both  Pupils are   Reactive and  Equal.  Movements  Are  Intact. No  Nystagmus. No  ANTWAN. No  Afferent  Pupillary  Defect noted. 5 CN :  Normal  Facial sensations and Corneal  Reflexes           7 CN:  Normal  Facial  Symmetry  And  Strength. No facial  Weakness. 8 CN :  Hearing  Appears within normal limits          9, 10 CN: Normal   spontaneous, reflex   palate   movements         11 CN:   Normal  Shoulder  shrug and  strength         12 CN :   Normal  Tongue movements and  Tongue  In midline                        No tongue   Fasciculations or atrophy       C) MOTOR  EXAM:                 Strength  In upper  And  Lower   extremities   within   normal limits               No  Drift.      No  Atrophy               Rapid   alternating  And repetitions  Movements  within   normal limits                 Muscle  Tone  In upper  And  Lower  Extremities  normal                No rigidity. No  Spasticity. Bradykinesia   absent                 No  Asterixis. Sustention  Tremor , Resting   Tremor   absent                    No   other  Abnormal  Movements noted           D) SENSORY :               Light   touch, pinprick,   position  And  Vibration     DECREASED                          BELOW  KNEE  LEVELS   BILATERALLY        E) REFLEXES:                   Deep  Tendon  Reflexes   DECREASED                   No  pathological  Reflexes  Bilaterally. F) COORDINATION  AND  GAIT :                                Station and  Gait  normal                              Romberg 's test   POSITIVE                            Ataxia negative      ASSESSMENT:      Patient Active Problem List   Diagnosis    Hypertension    Hyperlipidemia    Depression with anxiety    YESICA (obstructive sleep apnea)    Osteoarthritis    Smoker    Idiopathic peripheral neuropathy    Numbness and tingling of both legs below knees    Neuropathic pain    Chronic obstructive pulmonary disease (HCC)    Sensory ataxia    H/O fall    Chronic lumbar radiculopathy           VISITING DIAGNOSIS:      ICD-10-CM    1. Idiopathic peripheral neuropathy  G60.9    2. Numbness and tingling of both legs below knees  R20.0 MRI LUMBAR SPINE WO CONTRAST    R20.2    3. Sensory ataxia  R27.8    4. H/O fall  Z91.81    5. Neuropathic pain  M79.2    6. Primary hypertension  I10    7. YESICA (obstructive sleep apnea)  G47.33    8. Chronic lumbar radiculopathy  M54.16                 CONCERNS   &   INCREASED   RISK   FOR         *  MONONEUROPATHIES,   RADICULOPATHY        *    BALANCE PROBLMES   AND  FALL                VARIOUS  RISK   FACTORS   WERE  REVIEWED   AND   DISCUSSED.           *  PATIENT   HAS  MULTIPLE   MEDICAL, NEUROLOGICAL MEDICATIONS    PRESCRIBED       AS    RECOMMENDED       *   Compliance   With  Medications   And  Instructions          *    Antiplatelet  therapy    As   Recommended  Was   Discussed      *    Prophylactic  Use   Of     Vitamin   B   Complex,  Folic  Acid,    Vitamin  B12    Multivitamin,       Calcium  With  magnesium  And  Vit D    Supplementations   Over  The  Counter  Discussed             *FOOT  CARE, DAILY  INSPECTION  OF  FEET   AND         PERIODIC  PODIATRY EVALUATIONS . Orders Placed This Encounter   Procedures    MRI LUMBAR SPINE WO CONTRAST                             Orders Placed This Encounter   Medications    OXcarbazepine (TRILEPTAL) 150 MG tablet     Sig: TWO    TABLETS  TWICE  DAILY     Dispense:  120 tablet     Refill:  2    meloxicam (MOBIC) 15 MG tablet     Sig: Take 1 tablet by mouth daily     Dispense:  30 tablet     Refill:  1               *PATIENT   TO  FOLLOW  UP  WITH   PRIMARY  CARE         OTHER  CONSULTANTS  AS  BEFORE.               *TO  FOLLOW  WITH   MENTAL  HEALTH  PROFESSIONALS ,  INCLUDING            PSYCHOLOGICAL  COUNSELING   AND  PSYCHIATRIC  EVALUTIONS,                     *  Maintain   Healthy  Life Style    With   Periodic  Monitoring  Of          Any  Medical  Conditions  Including   Elevated  Blood  Pressure,  Lipid  Profile,        Blood  Sugar levels  AndHeart  Disease. *   Period   Screening  For  Cancers  Involving  Breast,  Colon,         Lungs  And  Other  Organs  As  Applicable,  In consultation   With  Your  Primary Care Providers. *Second  Neurological  Opinion  And  Evaluations  In  [facility] AND [facility]  Setting  If  Patient  Is  Interested. * Please   Contact   Neurology  Clinic   Early   If   Are  Any  New  Neurological   And  Any neurological  Concerns.                    *  If  The  Patient remains  Neurologically  Stable   Return   To  [facility] Neurology Department   IN      1-2        MONTHS  TIME   FOR  FURTHER              FOLLOW UP.                       *   The  Neurological   Findings,  Possible  Diagnosis,  Differential diagnoses                    And  Options  For    Further   Investigations                   And  management   Are  Discussed  Comprehensively. Medications   And  Prescription   Risks  And  Side effects  Are   Also  Discussed. *  If   There is  Any  Significant  Worsening   Of  Current  Symptoms  And  Or                  If patient  Develops   Any additional  New  NeurologicalSymptoms                  Or  Significant  Concerns   Should  Call  911 or  Go  To  Emergency  Department                  For  Further  Immediate  Evaluation and  management . The   Above  Were  Reviewed  With  PATIENT   and                          questions  Answered  In  Detail. Electronically signed by   Schuyler Hatch M.D., Clinton Coyle. Board Certified in  Neurology &  In  [employer] [facility] [employer] and Neu[employer] ([employer])      DISCLAIMER:   Although every effort was made to ensure the accuracy of this  electronictranscription, some errors in transcription may have occurred. GENERAL PATIENT INSTRUCTIONS:      A Healthy Lifestyle: Care Instructions   Your Care Instructions   A healthy lifestyle can help you feel good, stay at ahealthy weight, and have plenty of energy for both work and play. A healthy lifestyle is something you can share with your whole family.  A healthy lifestyle also can lower your risk for serious health problems, such ashigh blood pressure, heart disease, and diabetes.  You can follow a few steps listed below to improve your health and the health of your family.  Follow-up careis a key part of your treatment and safety.  Be sure to make and go to programs and medicines.  Keep trying.  In addition to reducing your risk of diseases in the future, you will notice some benefits soon after you stop using tobacco. If you have shortness of breath or asthma symptoms, they will likely getbetter within a few weeks after you quit.  Limit how much alcohol you drink. Moderate amounts of alcohol (up to 2 drinks a day for men, 1drink a day for women) are okay. But drinking too much can lead to liver problems, high blood pressure, and other health problems.  health   If you have a family, there are many things you can do together to improve your health.  Eat meals together as a family as often as possible.  Eat healthy foods. This includes fruits, vegetables, lean meats and dairy, and whole grains.  Include your family in your fitness plan. Most peoplethink of activities such as jogging or tennis as the way to fitness, but there are many ways you and your family can be more active. Anything that makes you breathe hard and gets your heart pumping is exercise. Here are sometips:   Walk to do errands or to take your child to school or the bus.  Go for a family bike ride after dinner instead of watching TV.  Where can you learn more?  Go [email or web address]://[email or web address].[email or web address] org and sign in to your LetMeGo account. Enter F363 in the Search HealthInformation box to learn more about \"A Healthy Lifestyle: Care Instructions. \"     If you do not have anaccount, please click on the \"Sign Up Now\" link.  Current as of: July 26, 2016   Content Version: 11.2   © [id number] [employer]. Care instructions adapted under license by [facility]. If you have questions about a medical condition or this instruction, always ask your healthcare professional. [employer] disclaims any warranty or liability for your use of this information.

## 2022-06-20 NOTE — PATIENT INSTRUCTIONS
* FALL   PRECAUTIONS. * AVOID PROLONGED  STANDING          *   ADEQUATE   FLUID  INTAKE   AND  ELECTROLYTE  BALANCE             * KEEP  DAIRY  OF   THE  NEUROLOGICAL  SYMPTOMS          *  TO  MAINTAIN  REGULAR  SLEEP  WAKE  CYCLES. *   TO  HAVE  ADEQUATE  REST  AND   SLEEP    HOURS.          *    AVOID  USAGE OF   TOBACCO,  EXCESSIVE  ALCOHOL                AND   ILLEGAL   SUBSTANCES,  IF  ANY          *  Maintain   Healthy  Life Style    With   Periodic  Monitoring  Of         Any  Medical  Conditions  Including   Elevated  Blood  Pressure,  Lipid  Profile,       Blood  Sugar levels  And   Heart  Disease. *   Period   Screening  For  Cancers  Involving  Breast,  Colon,         Lungs  And  Other  Organs  As  Applicable,           In consultation   With  Your  Primary Care Providers. *  If   There is  Any  Significant  Worsening   Of  Current  Symptoms  And             Or  If    Any additional  New  Neurological  Symptoms  and          Significant  Concerns   Should  Call  911 or  Go  To  Emergency  Department            For  Further  Immediate  Evaluation.

## 2022-06-28 ENCOUNTER — HOSPITAL ENCOUNTER (OUTPATIENT)
Dept: MRI IMAGING | Age: 62
Discharge: HOME | End: 2022-06-30

## 2022-06-28 ENCOUNTER — TELEPHONE (OUTPATIENT)
Dept: MRI IMAGING | Age: 62
End: 2022-06-28

## 2022-06-28 DIAGNOSIS — R20.2 NUMBNESS AND TINGLING OF BOTH LEGS BELOW KNEES: ICD-10-CM

## 2022-06-28 DIAGNOSIS — R20.0 NUMBNESS AND TINGLING OF BOTH LEGS BELOW KNEES: ICD-10-CM

## 2022-06-28 NOTE — TELEPHONE ENCOUNTER
Nilam Shaffer was unable to do the MRI due to claustrophobia, please contact the patient and advise him of his options.

## 2022-06-28 NOTE — TELEPHONE ENCOUNTER
Chart   reviewed. Please   schedule  CT    LUMBAR  SPINE,    IF PATIENT  WILLING  FOR  THE  SAME.       [address] [facility]

## 2022-07-06 DIAGNOSIS — R20.2 NUMBNESS AND TINGLING OF BOTH LEGS BELOW KNEES: Primary | ICD-10-CM

## 2022-07-06 DIAGNOSIS — R20.0 NUMBNESS AND TINGLING OF BOTH LEGS BELOW KNEES: Primary | ICD-10-CM

## 2022-07-06 DIAGNOSIS — M54.16 CHRONIC LUMBAR RADICULOPATHY: ICD-10-CM

## 2022-07-11 ENCOUNTER — HOSPITAL ENCOUNTER (OUTPATIENT)
Dept: CT IMAGING | Age: 62
Discharge: HOME | End: 2022-07-13
Payer: COMMERCIAL

## 2022-07-11 DIAGNOSIS — R20.2 NUMBNESS AND TINGLING OF BOTH LEGS BELOW KNEES: ICD-10-CM

## 2022-07-11 DIAGNOSIS — R20.0 NUMBNESS AND TINGLING OF BOTH LEGS BELOW KNEES: ICD-10-CM

## 2022-07-11 DIAGNOSIS — M54.16 CHRONIC LUMBAR RADICULOPATHY: ICD-10-CM

## 2022-07-11 PROCEDURE — 72131 CT LUMBAR SPINE W/O DYE: CPT

## 2022-07-22 ENCOUNTER — OFFICE VISIT (OUTPATIENT)
Dept: NEUROLOGY | Age: 62
End: 2022-07-22
Payer: COMMERCIAL

## 2022-07-22 VITALS
BODY MASS INDEX: 32.23 KG/M2 | OXYGEN SATURATION: 96 % | WEIGHT: 238 LBS | HEART RATE: 80 BPM | DIASTOLIC BLOOD PRESSURE: 68 MMHG | SYSTOLIC BLOOD PRESSURE: 124 MMHG | HEIGHT: 72 IN

## 2022-07-22 DIAGNOSIS — R27.8 SENSORY ATAXIA: ICD-10-CM

## 2022-07-22 DIAGNOSIS — F41.8 DEPRESSION WITH ANXIETY: ICD-10-CM

## 2022-07-22 DIAGNOSIS — M79.2 NEUROPATHIC PAIN: ICD-10-CM

## 2022-07-22 DIAGNOSIS — M51.36 LUMBAR DEGENERATIVE DISC DISEASE: ICD-10-CM

## 2022-07-22 DIAGNOSIS — F17.200 SMOKER: ICD-10-CM

## 2022-07-22 DIAGNOSIS — G60.9 IDIOPATHIC PERIPHERAL NEUROPATHY: ICD-10-CM

## 2022-07-22 DIAGNOSIS — R20.2 NUMBNESS AND TINGLING OF BOTH LEGS BELOW KNEES: ICD-10-CM

## 2022-07-22 DIAGNOSIS — M54.16 CHRONIC LUMBAR RADICULOPATHY: Primary | ICD-10-CM

## 2022-07-22 DIAGNOSIS — Z91.81 H/O FALL: ICD-10-CM

## 2022-07-22 DIAGNOSIS — R20.0 NUMBNESS AND TINGLING OF BOTH LEGS BELOW KNEES: ICD-10-CM

## 2022-07-22 PROBLEM — M51.369 LUMBAR DEGENERATIVE DISC DISEASE: Status: ACTIVE | Noted: 2022-07-22

## 2022-07-22 PROCEDURE — 99214 OFFICE O/P EST MOD 30 MIN: CPT | Performed by: PSYCHIATRY & NEUROLOGY

## 2022-07-22 RX ORDER — MELOXICAM 15 MG/1
15 TABLET ORAL DAILY
Qty: 30 TABLET | Refills: 1 | Status: SHIPPED | OUTPATIENT
Start: 2022-07-22 | End: 2022-09-30 | Stop reason: SDUPTHER

## 2022-07-22 RX ORDER — OXCARBAZEPINE 150 MG/1
TABLET, FILM COATED ORAL
Qty: 120 TABLET | Refills: 2 | Status: SHIPPED | OUTPATIENT
Start: 2022-07-22 | End: 2022-10-13 | Stop reason: SDUPTHER

## 2022-07-22 ASSESSMENT — ENCOUNTER SYMPTOMS
VOMITING: 0
SORE THROAT: 0
PHOTOPHOBIA: 0
TROUBLE SWALLOWING: 0
CONSTIPATION: 0
VOICE CHANGE: 0
CHOKING: 0
VISUAL CHANGE: 0
APNEA: 0
COLOR CHANGE: 0
NAUSEA: 0
ABDOMINAL PAIN: 0
DIARRHEA: 0
SINUS PRESSURE: 0
COUGH: 0
EYE ITCHING: 0
EYE REDNESS: 0
EYE DISCHARGE: 0
BLOOD IN STOOL: 0
FACIAL SWELLING: 0
BACK PAIN: 1
WHEEZING: 0
CHEST TIGHTNESS: 0
ABDOMINAL DISTENTION: 0
EYE PAIN: 0
SHORTNESS OF BREATH: 0

## 2022-07-22 NOTE — PROGRESS NOTES
[facility]  Neurology    [address] [place] [address]  NMFQY:[phone]   Fax: [phone]        SUBJECTIVE:       PATIENT ID:  Sheyla Kwok is a  RIGHT    HANDED 63 y.o. male. Neurologic Problem  The patient's primary symptoms include clumsiness, focal sensory loss and a loss of balance. The patient's pertinent negatives include no focal weakness, memory loss, near-syncope, syncope, visual change or weakness. Primary symptoms comment: PERIPHERAL  POLYNEUROPATHY  AND  CHRONIC   LUMBAR  PAIN    . This is a chronic problem. The neurological problem developed insidiously. Associated symptoms include back pain and light-headedness. Pertinent negatives include no abdominal pain, chest pain, confusion, dizziness, fatigue, fever, headaches, nausea, neck pain, palpitations, shortness of breath or vomiting. Past treatments include bed rest and sleep. The treatment provided no relief. His past medical history is significant for mood changes. There is no history of a bleeding disorder, a clotting disorder, a CVA, dementia, head trauma, liver disease or seizures. History obtained from  The   PATIENT         and other  available   medical  records   were  Also  reviewed. The  Duration,  Quality,  Severity,  Location,  Timing,  Context,  Modifying  Factors   Of   The   Chief   Complaint       And  Present  Illness  Was   Reviewed   In   Chronological   Manner. PATIENT'S  MAIN  CONCERNS INCLUDE :                     1)     H/O   CHRONIC  PERIPHERAL  POLYNEUROPATHY                                     BOTH LOWER  EXTREMITIES    FOR    4   YEARS                        2)    NO    H/O    DM.                                3)   PREVIOUS      H/O      ALCOHOL    ABUSE                                        QUIT  ALCOHOL   FOR   3   YEARS                 4)    H/O     CHRONIC  SMOKING                    PATIENT  AWARE  OF  RISKS  AND SIDE  EFFECTS  OF   SMOKING   DISCUSSED. PATIENT   ADVISED   AND  COUNSELED    TO   QUIT  SMOKING. 5)      H/O   CHRONIC  ANXIETY,  DEPRESSION                                              FOR    5 - 6    YEARS                            -  ON LAMICTAL,  ZOLOFT ,   EFFEXOR                    6)     CO   MORBID  MEDICAL  CONDITIONS                              BEING  FOLLOWED  BY   HIS PCP                       7)       PATIENT    FACTORY     WORKER       FOR    30   YEARS                               INVOLVING  PROLONGED   STANDING                      8)      H/O   CHRONIC  LUMBAR  PAIN    FOR    10    YEARS                        9)     EMG /  NC  STUDIES  IN   FEB. 2022     SHOWED                           A)   MODERATE  PERIPHERAL  POLYNEUROPATHY                          B)    CHRONIC  LOWER  LUMBAR  RADICULOPATHY                                              10)     PREVIOUS      H/O    FALL                                       -   UN EXPLAINED                        11)          SENSORY   ATAXIA                            12)      LABS   WORK  UP   FOR  NEUROPATHY     DID  NOT                                SHOW     ANY  SIGNIFICANT  ABNORMALITIES                                 CHEST  X RAY   SHOWED    NO    ACUTE PATHOLOGY                                  PATIENT STARTED    ON     TRILEPTAL   IN  MAY  2022                               DOSE    WILL  BE  UP  TITRATED      FOR  SYMPTOMATIC  RELIEF                                                        13)        PATIENT     HAD   MRI  LUMBAR  SPINE    FOR                                     CHRONIC    LUMBAR  RADICULAR  PAIN    IN  June 2022                                 SHOWED      MULTI  LEVEL   DEGENERATIVE  DISC  DISEASE                                    -   RESULTS    REVIEWED.                                      EXPECTATIONS   AND  GOAL  OF  MANAGEMENT                                          OF MULTIPLE    ISSUES     DISCUSSED                        14)         RECOMMENDED:                                         A)    FALL  PRECAUTIONS                                        B)    AVOID PROLONGED   STANDING                                        C)   AVOID  BACK  STRAINING  ACTIVITIES                                        D)     TO  CONTINUE    MOBIC    15   MG  PO  DAILY                                                     AND  TRILEPTAL   FOR    SYMPTOMATIC  RELIEF                                        E)   PHYSICAL  THERAPY                                         F)    PAIN  MANAGEMENT      DEFERRED                                            G)     SURGICAL  MANAGEMENT     NOT  INDICATED                                  -    REVIEWED    AND  DISCUSSED      WITH PATIENT  IN  DETAIL                                         15)      VARIOUS  RISK   FACTORS   WERE  REVIEWED   AND   DISCUSSED. PATIENT   HAS  MULTIPLE   MEDICAL, NEUROLOGICAL                        AND   MENTAL HEALTH   PROBLEMS .                                                PRECIPITATING  FACTORS: including  fever/infection, exertion/relaxation, position change, stress,                weather change,   medications/alcohol, time of day/darkness/light  Are  present                                                          MODIFYING  FACTORS:  fever/infection, exertion/relaxation, position change, stress, weather change,               medications/alcohol, time of day/darkness/light  Are  present                Patient   Indicates   The  Presence   And  The  Absence  Of  The  Following    Associated  And             Additional  Neurological    Symptoms:                                Balance  And coordination   problems  present           Gait problems     absent            Headaches      absent              Migraines           absent           Memory problemsabsent              Confusion        absent            Paresthesia numbness          absent           Seizures  And  Starring  Episodes           absent           Syncope,  Near  syncopal episodes         absent           Speech   problems           absent             Swallowing   Problems      absent            Dizziness,  Light headedness           absent              Vertigo        absent             Generalized   Weakness    absent              focal  Weakness     absent             Tremors         absent              Sleep  Problems     absent             History  Of   Recent  Head  Injury     absent             History  Of   Recent  TIA     absent             History  Of   Recent    Stroke     absent             Neck  Pain   and   Neck muscle  Spasms  absent               Radiating  down   And   Weakness           absent            Lower back   Pain  And     Spasms  present              Radiating    Down   And   Weakness          present                H/OFALLS        present               History  Of   Visual  Symptoms    absent                  Associated   Diplopia       absent                                               Also   Additional   Symptoms   Present    As  Documented    In   The   detailed                  Review  Of  Systems   And    Please   Refer   To    Them for   Additional    Information. Any components  That are either  Unobtainable  Or  Limited  In   HPI, ROS  And/or PFSH   Are                   Due   ToPatient's  Medical  Problems,  Clinical  Condition   and/or lack of                                 other    Alternate   resources. RECORDS   REVIEWED:    historical medical records           INFORMATION   REVIEWED:     MEDICAL   HISTORY,SURGICAL   HISTORY,     MEDICATIONS   LIST,   ALLERGIES AND  DRUG  INTOLERANCES,       FAMILY   HISTORY,  SOCIAL  HISTORY,      PROBLEM  LIST   FOR  PATIENT  CARE   COORDINATION          Past Medical History:   Diagnosis Date    Adult situational stress disorder     Anger     Issues. Depression with anxiety     History of tobacco abuse     Hyperlipidemia     Hypertension     YESICA (obstructive sleep apnea)     cpap noncompliace    Osteoarthritis     back and knees    Suicide attempt [facility]) Age 26         Past Surgical History:   Procedure Laterality Date    COLONOSCOPY  03/21/2013    With EGD with biopsies and polypectomies showing severe esophagitis with ulceration, gastritis with antral erosions, hiatal hernia and small rectal polyp. EYE SURGERY Left     INGUINAL HERNIA REPAIR  06/16/2021    lap left ing [id number]    KNEE ARTHROSCOPY Left     Meniscus repair, [address] [facility], Dr. Sergio Cox. MEATOTOMY      For meatal stenosis. UVULECTOMY      WISDOM TOOTH EXTRACTION           Current Outpatient Medications   Medication Sig Dispense Refill    OXcarbazepine (TRILEPTAL) 150 MG tablet TWO    TABLETS  TWICE  DAILY 120 tablet 2    meloxicam (MOBIC) 15 MG tablet Take 1 tablet by mouth daily 30 tablet 1    aspirin 81 MG EC tablet Take 81 mg by mouth      glucosamine-chondroitin 500-400 MG tablet Take 2 tablets by mouth      tamsulosin (FLOMAX) 0.4 MG capsule Take 0.4 mg by mouth every evening      hydrochlorothiazide (HYDRODIURIL) 25 MG tablet TAKE ONE TABLET BY MOUTH ONCE DAILY 90 tablet 0    simvastatin (ZOCOR) 40 MG tablet Take 1 tablet by mouth daily. 90 tablet 1    lamoTRIgine (LAMICTAL) 25 MG tablet Take 1 tablet by mouth 2 times daily. 180 tablet 1    omeprazole (PRILOSEC) 40 MG capsule Take 1 capsule by mouth daily. 90 capsule 1    sertraline (ZOLOFT) 100 MG tablet Take 1.5 tablets by mouth daily. 135 tablet 1    venlafaxine (EFFEXOR-XR) 75 MG XR capsule Take 1 capsule by mouth daily. 90 capsule 1     No current facility-administered medications for this visit. No Known Allergies      Family History   Problem Relation Age of Onset    Other Father         Paralyzed. Heart Attack Father         Myocardial infarction.     Coronary Art Dis Father     Stroke Maternal Grandmother     Diabetes Maternal Grandmother     Cancer Sister         Unknown type.     Glaucoma Mother     Sudden Death Sister         shot by  during divorce         Social History     Socioeconomic History    Marital status:      Spouse name: Not on file    Number of children: Not on file    Years of education: Not on file    Highest education level: Not on file   Occupational History    Not on file   Tobacco Use    Smoking status: Every Day     Packs/day: 1.00     Years: 40.00     Pack years: 40.00     Types: Cigarettes     Start date: 1/1/1982    Smokeless tobacco: Never   Substance and Sexual Activity    Alcohol use: No    Drug use: No    Sexual activity: Not on file   Other Topics Concern    Not on file   Social History Narrative    Not on file     Social Determinants of Health     Financial Resource Strain: Not on file   Food Insecurity: Not on file   Transportation Needs: Not on file   Physical Activity: Not on file   Stress: Not on file   Social Connections: Not on file   Intimate Partner Violence: Not on file   Housing Stability: Not on file       Vitals:    07/22/22 0906   BP: 124/68   Pulse: 80   SpO2: 96%         Wt Readings from Last 3 Encounters:   07/22/22 238 lb (108 kg)   06/28/22 238 lb (108 kg)   06/20/22 238 lb (108 kg)         BP Readings from Last 3 Encounters:   07/22/22 124/68   06/20/22 122/70   05/09/22 126/84           Hematology and Coagulation    Lab Results   Component Value Date/Time    WBC 8.8 05/09/2022 10:38 AM    RBC 5.24 05/09/2022 10:38 AM    HGB 16.1 05/09/2022 10:38 AM    HCT 48.2 05/09/2022 10:38 AM    MCV 92.0 05/09/2022 10:38 AM    MCH 30.7 05/09/2022 10:38 AM    MCHC 33.4 05/09/2022 10:38 AM    RDW 14.4 05/09/2022 10:38 AM     05/09/2022 10:38 AM    MPV 10.3 05/09/2022 10:38 AM       No results found for: ESR    Chemistries    Lab Results   Component Value Date/Time     05/09/2022 10:38 AM    K 3.8 05/09/2022 10:38 AM     05/09/2022 10:38 AM    CO2 27 05/09/2022 10:38 AM    BUN 16 05/09/2022 10:38 AM    CREATININE 0.81 05/09/2022 10:38 AM    CALCIUM 9.5 05/09/2022 10:38 AM    PROT 7.2 05/09/2022 10:38 AM    PROT 7.6 05/09/2022 10:38 AM    LABALBU 4.8 05/09/2022 10:38 AM    BILITOT 0.51 05/09/2022 10:38 AM    ALKPHOS 55 05/09/2022 10:38 AM    AST 25 05/09/2022 10:38 AM    ALT 16 05/09/2022 10:38 AM     Lab Results   Component Value Date/Time    ALKPHOS 55 05/09/2022 10:38 AM    ALT 16 05/09/2022 10:38 AM    AST 25 05/09/2022 10:38 AM    PROT 7.2 05/09/2022 10:38 AM    PROT 7.6 05/09/2022 10:38 AM    BILITOT 0.51 05/09/2022 10:38 AM    LABALBU 4.8 05/09/2022 10:38 AM     Lab Results   Component Value Date/Time    BUN 16 05/09/2022 10:38 AM    CREATININE 0.81 05/09/2022 10:38 AM     Lab Results   Component Value Date/Time    CALCIUM 9.5 05/09/2022 10:38 AM     Lab Results   Component Value Date/Time    AST 25 05/09/2022 10:38 AM    ALT 16 05/09/2022 10:38 AM           Review of Systems   Constitutional:  Negative for appetite change, chills, fatigue, fever and unexpected weight change. HENT:  Negative for congestion, dental problem, drooling, ear discharge, ear pain, facial swelling, hearing loss, mouth sores, nosebleeds, postnasal drip, sinus pressure, sore throat, tinnitus, trouble swallowing and voice change. Eyes:  Negative for photophobia, pain, discharge, redness, itching and visual disturbance. Respiratory:  Negative for apnea, cough, choking, chest tightness, shortness of breath and wheezing. Cardiovascular:  Negative for chest pain, palpitations, leg swelling and near-syncope. Gastrointestinal:  Negative for abdominal distention, abdominal pain, blood in stool, constipation, diarrhea, nausea and vomiting. Endocrine: Negative for cold intolerance, heat intolerance, polydipsia, polyphagia and polyuria. Musculoskeletal:  Positive for back pain.  Negative for arthralgias, gait problem, joint swelling, myalgias, neck pain and neck stiffness. Skin:  Negative for color change, pallor, rash and wound. Allergic/Immunologic: Negative for environmental allergies, food allergies and immunocompromised state. Neurological:  Positive for light-headedness and loss of balance. Negative for dizziness, tremors, focal weakness, seizures, syncope, facial asymmetry, speech difficulty, weakness, numbness and headaches. Hematological:  Negative for adenopathy. Does not bruise/bleed easily. Psychiatric/Behavioral:  Negative for agitation, behavioral problems, confusion, decreased concentration, dysphoric mood, hallucinations, memory loss, self-injury, sleep disturbance and suicidal ideas. The patient is nervous/anxious. The patient is not hyperactive. OBJECTIVE:      Physical Exam  Constitutional:       Appearance: He is well-developed. HENT:      Head: Normocephalic and atraumatic. No raccoon eyes or Reynoso's sign. Right Ear: External ear normal.      Left Ear: External ear normal.      Nose: Nose normal.   Eyes:      Conjunctiva/sclera: Conjunctivae normal.      Pupils: Pupils are equal, round, and reactive to light. Neck:      Thyroid: No thyroid mass or thyromegaly. Vascular: No carotid bruit. Trachea: No tracheal deviation. Meningeal: Brudzinski's sign and Kernig's sign absent. Cardiovascular:      Rate and Rhythm: Normal rate and regular rhythm. Pulmonary:      Effort: Pulmonary effort is normal.   Musculoskeletal:         General: No tenderness. Cervical back: Normal range of motion and neck supple. No rigidity. No muscular tenderness. Normal range of motion. Skin:     General: Skin is warm. Coloration: Skin is not pale. Findings: No erythema or rash. Nails: There is no clubbing. Psychiatric:         Attention and Perception: He is attentive. Mood and Affect: Mood is anxious. Mood is not depressed. Affect is not labile, blunt or inappropriate.          Speech: He is communicative. Speech is not rapid and pressured, delayed, slurred or tangential.         Behavior: Behavior is not agitated, slowed, aggressive, withdrawn, hyperactive or combative. Behavior is cooperative. Thought Content: Thought content is not paranoid or delusional. Thought content does not include homicidal or suicidal ideation. Thought content does not include homicidal or suicidal plan. Cognition and Memory: Memory is not impaired. He does not exhibit impaired recent memory or impaired remote memory. Judgment: Judgment is not impulsive or inappropriate. NEUROLOGICALEXAMINATION :       A) MENTAL STATUS:                   Alert and  oriented  To time, place  And  Person. No Aphasia. No  Dysarthria. Able   To  Follow     SIMPLE    commands   without   Any  Difficulty. No right  To left confusion. Normal  Speech  And language function. Insight and  Judgment ,Fund  Of  Knowledge   within normal limits                Recent  And  Remote memory  within   normal limits                Attention &  Concentration are within   normal limits                                                   B) CRANIAL NERVES :        CN : Visual  Acuity  And  Visual fields  within normal limits               Fundi  Could  Not  Be  Could  Not  Be  Evaluated. 3,4,6 CN : Both  Pupils are   Reactive and  Equal.  Movements  Are  Intact. No  Nystagmus. No  ANTWAN. No  Afferent  Pupillary  Defect noted. 5 CN :  Normal  Facial sensations and Corneal  Reflexes           7 CN:  Normal  Facial  Symmetry  And  Strength. No facial  Weakness.            8 CN :  Hearing  Appears within normal limits          9, 10 CN: Normal   spontaneous, reflex   palate   movements         11 CN:   Normal  Shoulder  shrug and  strength         12 CN :   Normal  Tongue movements and  Tongue  In midline                        No tongue   Fasciculations or atrophy       C) MOTOR  EXAM:                 Strength  In upper  And  Lower   extremities   within   normal limits               No  Drift. No  Atrophy               Rapid   alternating  And  repetitions  Movements  within   normal limits                 Muscle  Tone  In upper  And  Lower  Extremities  normal                No rigidity. No  Spasticity. Bradykinesia   absent                 No  Asterixis. Sustention  Tremor , Resting   Tremor   absent                    No   other  Abnormal  Movements noted           D) SENSORY :               Light   touch, pinprick,   position  And  Vibration     DECREASED                          BELOW  KNEE  LEVELS   BILATERALLY        E) REFLEXES:                   Deep  Tendon  Reflexes   DECREASED                   No  pathological  Reflexes  Bilaterally. F) COORDINATION  AND  GAIT :                                Station and  Gait  normal                              Romberg 's test   POSITIVE                            Ataxia negative      ASSESSMENT:      Patient Active Problem List   Diagnosis    Hypertension    Hyperlipidemia    Depression with anxiety    YESICA (obstructive sleep apnea)    Osteoarthritis    Smoker    Idiopathic peripheral neuropathy    Numbness and tingling of both legs below knees    Neuropathic pain    Chronic obstructive pulmonary disease (HCC)    Sensory ataxia    H/O fall    Chronic lumbar radiculopathy    Lumbar degenerative disc disease       CT OF THE LUMBAR SPINE WITHOUT CONTRAST  7/11/2022       TECHNIQUE:   CT of the lumbar spine was performed without the administration of   intravenous contrast. Multiplanar reformatted images are provided for review. Adjustment of mA and/or kV according to patient size was utilized.   Automated   exposure control, iterative reconstruction, and/or weight based adjustment of   the mA/kV was utilized to reduce the radiation dose to as low as reasonably   achievable. COMPARISON:   MRI of the lumbar spine February 28, 2022. HISTORY:   ORDERING SYSTEM PROVIDED HISTORY: Numbness and tingling of both legs below   knees   TECHNOLOGIST PROVIDED HISTORY:   Reason for Exam: Numbness and tingling of both legs       FINDINGS:   Retroperitoneum/soft tissues: Mass projecting from the mid aspect of the   right kidney measuring proximally 2 cm diameter and noted on previous MRI to   represent a cyst.       Calcifications present within the wall of the aorta and branching vessels. Vacuum joint phenomena in portions of the right and left SI joints. 5 lumbar type vertebral bodies. Straightening of the normal lumbar   curvature. Approximately 2 mm posterior listhesis L3 on L4 and L4 on L5 and   L5 on S1 with vacuum joint phenomenon L3-4, L4-5, and L5-S1. Spondylitic   endplate degenerative changes throughout, greater anteriorly at L1-2. Narrowing of all of the disc spaces but somewhat greater at L4-5 and L5-S1. T12-L1: Some degenerative changes in the endplates and facets. No   significant narrowing of the central canal or the neural foramina. L1-2: Degenerative changes in the endplates and facets. Mild facet   hypertrophy. No significant narrowing of the central canal or the neural   foramina. Mild narrowing of the disc space itself. L2-3: Disc dehydration. Degenerative changes in the endplates and facets. Mild facet hypertrophy. No significant narrowing of the central canal.   Narrowing of the inferior recesses. L3-4: Disc dehydration. Degenerative changes in the endplates and facets. Facet hypertrophy. Narrowing of the right neural foramen and the inferior   recesses. Bulging disc containing calcification posteriorly as well as   spondylitic changes. No significant narrowing of the central canal.       L4-5: Disc dehydration.   Narrowing of the disc space.  Degenerative changes   in the endplates and facets. Facet hypertrophy. Right lateral spondylitic   changes and calcification in a right lateral disc causes marked narrowing of   the right neural foramen. Mild narrowing of the left neural foramen. L5-S1: Disc dehydration. Narrowing of the disc space. Degenerative changes   in the endplates and facets with a degree of facet hypertrophy. Spondylitic   spurring is greater toward the right and causes marked narrowing of the right   neural foramen. Mild narrowing of the left neural foramen. No significant   narrowing of central canal.           Impression   Degenerative changes in the lumbar spine as described in detail above. VISITING DIAGNOSIS:      ICD-10-CM    1. Chronic lumbar radiculopathy  M54.16       2. Numbness and tingling of both legs below knees  R20.0     R20.2       3. H/O fall  Z91.81       4. Smoker  F17.200       5. Sensory ataxia  R27.8       6. Neuropathic pain  M79.2       7. Idiopathic peripheral neuropathy  G60.9       8. Depression with anxiety  F41.8       9. Lumbar degenerative disc disease  M51.36                    CONCERNS   &   INCREASED   RISK   FOR         *  MONONEUROPATHIES,   RADICULOPATHY        *    BALANCE PROBLMES   AND  FALL                VARIOUS  RISK   FACTORS   WERE  REVIEWED   AND   DISCUSSED. *  PATIENT   HAS  MULTIPLE   MEDICAL, NEUROLOGICAL                        AND   MENTAL HEALTH   PROBLEMS . PATIENT'S   MANAGEMENT  IS  CHALLENGING. PLAN:                         Dewiragini Floresster  Of  The  Diagnoses,  The  Management & Treatment  Options            AND    Care  plan  Were          Reviewed and   Discussed   With  patient. * Goals  And  Expectations  Of  The  Therapy  Discussed   And  Reviewed.           *   Benefits   And   Side  Effect  Profile  Of  Medication/s   Were   Discussed                * Need   For  Further   Follow up For  The  Various  Problems Were discussed. * Results  Of  The  Previous  Diagnostic tests were reviewed and  discussed                   Medical  Decision  Making  Was  Made  Based on the   Complexity  Of  Above  Mentioned  Diagnoses,    Data reviewed             And    Risk  Of  Significant   Co morbidities and   complicating   Factors. Medical  Decision  Was     High     Complexity   Due   To  The  Patient's  Multiple  Symptoms  &  Disease,            Complex  Treatment  Regimen,  Multiple medications           and   Risk  Of   Side  Effects,  Difficulty  In  Medication  Management  And  Diagnostic  Challenges           In  View  Of  The  Associated   Co  Morbid  Conditions   And  Problems. * FALL   PRECAUTIONS. THESE  REVIEWED   AND  DISCUSSED      *    AVOID PROLONGED     STANDING          *     MAY   REST        AT    WORK     AS   NEEDED          *   BE  CAREFUL  WITH  ACTIVITIES          *   AVOID  BACK  STRAINING  ACTIVITIES              *   ADEQUATE   FLUID  INTAKE   AND  ELECTROLYTE  BALANCE           * KEEP  DAIRY  OF   THE  NEUROLOGICAL  SYMPTOMS        RECORDING THE    DURATION  AND  FREQUENCY. *TO  MAINTAIN  REGULAR  SLEEP  WAKE  CYCLES. *   TO  HAVE  ADEQUATE  REST  AND   SLEEP    HOURS.              *    AVOID  ANY USAGE OF    TOBACCO,          AVOID  EXCESSIVE  ALCOHOL  AND   ILLEGAL   SUBSTANCES          *  CONTINUE   MEDICATIONS    PRESCRIBED       AS    RECOMMENDED       *   Compliance   With  Medications   And  Instructions          *    Antiplatelet  therapy    As   Recommended  Was   Discussed      *    Prophylactic  Use   Of     Vitamin   B   Complex,  Folic  Acid,    Vitamin  B12    Multivitamin,       Calcium  With  magnesium  And  Vit D    Supplementations   Over  The  Counter  Discussed             *FOOT  CARE, DAILY  INSPECTION  OF  FEET   AND         PERIODIC  PODIATRY EVALUATIONS .                *     RECOMMENDED: A)    FALL  PRECAUTIONS                                        B)    AVOID PROLONGED   STANDING                                        C)   AVOID  BACK  STRAINING  ACTIVITIES                                        D)     TO  CONTINUE    MOBIC    15   MG  PO  DAILY                                                     AND  TRILEPTAL   FOR    SYMPTOMATIC  RELIEF                                        E)   PHYSICAL  THERAPY                                         F)    PAIN  MANAGEMENT      DEFERRED                                            G)     SURGICAL  MANAGEMENT     NOT  INDICATED                                  -    REVIEWED    AND  DISCUSSED      WITH PATIENT  IN  DETAIL                   Orders Placed This Encounter   Procedures    [facility] [place]       Orders Placed This Encounter   Medications    OXcarbazepine (TRILEPTAL) 150 MG tablet     Sig: TWO    TABLETS  TWICE  DAILY     Dispense:  120 tablet     Refill:  2    meloxicam (MOBIC) 15 MG tablet     Sig: Take 1 tablet by mouth in the morning. Dispense:  30 tablet     Refill:  1             *PATIENT   TO  FOLLOW  UP  WITH   PRIMARY  CARE         OTHER  CONSULTANTS  AS  BEFORE.               *TO  FOLLOW  WITH   MENTAL  HEALTH  PROFESSIONALS ,  INCLUDING            PSYCHOLOGICAL  COUNSELING   AND  PSYCHIATRIC  EVALUTIONS,                     *  Maintain   Healthy  Life Style    With   Periodic  Monitoring  Of          Any  Medical  Conditions  Including   Elevated  Blood  Pressure,  Lipid  Profile,        Blood  Sugar levels  AndHeart  Disease. *   Period   Screening  For  Cancers  Involving  Breast,  Colon,         Lungs  And  Other  Organs  As  Applicable,  In consultation   With  Your  Primary Care Providers. *Second  Neurological  Opinion  And  Evaluations  In  [facility]  Setting  If  Patient  Is  Interested.                  * Please   Contact   Neurology  Clinic   Early   If   Are  Any New  Neurological   And  Any neurological  Concerns. *  If  The  Patient remains  Neurologically  Stable   Return   To  [facility] Neurology Department   IN     3      MONTHS  TIME   FOR  FURTHER              FOLLOW UP.                       *   The  Neurological   Findings,  Possible  Diagnosis,  Differential diagnoses                    And  Options  For    Further   Investigations                   And  management   Are  Discussed  Comprehensively. Medications   And  Prescription   Risks  And  Side effects  Are   Also  Discussed. *  If   There is  Any  Significant  Worsening   Of  Current  Symptoms  And  Or                  If patient  Develops   Any additional  New  NeurologicalSymptoms                  Or  Significant  Concerns   Should  Call  911 or  Go  To  Emergency  Department                  For  Further  Immediate  Evaluation and  management . The   Above  Were  Reviewed  With  PATIENT   and                          questions  Answered  In  Detail. Electronically signed by   Sin Mcduffie M.D., Mandie Ramirez. Board Certified in  Neurology &  In  [facility] [facility] and Neurology ([facility])      DISCLAIMER:   Although every effort was made to ensure the accuracy of this  electronictranscription, some errors in transcription may have occurred. GENERAL PATIENT INSTRUCTIONS:     A Healthy Lifestyle: Care Instructions  Your Care Instructions  A healthy lifestyle can help you feel good, stay at ahealthy weight, and have plenty of energy for both work and play. A healthy lifestyle is something you can share with your whole family. A healthy lifestyle also can lower your risk for serious health problems, such ashigh blood pressure, heart disease, and diabetes.   You can follow a few steps listed below to improve your health and the health of your family. Follow-up careis a key part of your treatment and safety. Be sure to make and go to all appointments, and call your doctor if you are having problems. Its also a good idea to know your test results and keep a list of the medicines you take. How can you care for yourself at home? Do not eat too much sugar, fat, or fast foods. You can still have dessert and treats nowand then. The goal is moderation. Start small to improve your eating habits. Pay attention to portion sizes, drink less juice and soda pop, and eat more fruits and vegetables. Eat a healthy amount of food. A 3-ounce serving of meat, for example, is about the size of a deck of cards. Fill the rest of your plate with vegetables and whole grains. Limit theamount of soda and sports drinks you have every day. Drink more water when you are thirsty. Eat at least 5 servings of fruits and vegetables every day. It may seem like a lot, but it is not hard to reach this goal. Aserving or helping is 1 piece of fruit, 1 cup of vegetables, or 2 cups of leafy, raw vegetables. Have an apple or some carrot sticks as an afternoon snack instead of a candy bar. Try to have fruits and/or vegetables at everymeal.  Make exercise part of your daily routine. You may want to start with simple activities, such as walking, bicycling, or slow swimming. Try carmen active 30 to 60 minutes every day. You do not need to do all 30 to 60 minutes all at once. For example, you can exercise 3 times a day for 10 or 20 minutes. Moderate exercise is safe for most people, but it is always agood idea to talk to your doctor before starting an exercise program.  Keep moving. Kale [employer] the lawn, work in the garden, or [employer]. Take the stairs instead of the elevator at work. If you smoke, quit. Peoplewho smoke have an increased risk for heart attack, stroke, cancer, and other lung illnesses.  Quitting is hard, but there are ways to boost your chance of quitting tobacco for good. Use nicotine gum, patches, or lozenges. Ask your doctor about stop-smoking programs and medicines. Keep trying. In addition to reducing your risk of diseases in the future, you will notice some benefits soon after you stop using tobacco. If you have shortness of breath or asthma symptoms, they will likely getbetter within a few weeks after you quit. Limit how much alcohol you drink. Moderate amounts of alcohol (up to 2 drinks a day for men, 1drink a day for women) are okay. But drinking too much can lead to liver problems, high blood pressure, and other health problems. health  If you have a family, there are many things you can do together to improve your health. Eat meals together as a family as often as possible. Eat healthy foods. This includes fruits, vegetables, lean meats and dairy, and whole grains. Include your family in your fitness plan. Most peoplethink of activities such as jogging or tennis as the way to fitness, but there are many ways you and your family can be more active. Anything that makes you breathe hard and gets your heart pumping is exercise. Here are sometips:  Walk to do errands or to take your child to school or the bus. Go for a family bike ride after dinner instead of watching TV. Where can you learn more? Go toGaikaitps://[employer]. org and sign in to your [employer] account. Enter W229 in the Search HealthInformation box to learn more about \"A Healthy Lifestyle: Care Instructions. \"     If you do not have anaccount, please click on the \"Sign Up Now\" link. Current as of: July 26, 2016  Content Version: 11.2  © 0480-3708 [employer]. Care instructions adapted under license by [facility] ([facility]).  If you have questions about a medical condition or this instruction, always ask your healthcare professional. [employer] disclaims any warranty or liability for your use of this information.

## 2022-07-25 ENCOUNTER — HOSPITAL ENCOUNTER (OUTPATIENT)
Dept: PHYSICAL THERAPY | Age: 62
Setting detail: THERAPIES SERIES
Discharge: HOME | End: 2022-07-25
Payer: COMMERCIAL

## 2022-07-25 PROCEDURE — 97161 PT EVAL LOW COMPLEX 20 MIN: CPT | Performed by: PHYSICAL THERAPIST

## 2022-07-25 ASSESSMENT — PAIN - FUNCTIONAL ASSESSMENT: PAIN_FUNCTIONAL_ASSESSMENT: PREVENTS OR INTERFERES WITH ALL ACTIVE AND SOME PASSIVE ACTIVITIES

## 2022-07-25 ASSESSMENT — PAIN SCALES - GENERAL: PAINLEVEL_OUTOF10: 5

## 2022-07-25 ASSESSMENT — PAIN DESCRIPTION - DIRECTION: RADIATING_TOWARDS: LEGS AND FEET

## 2022-07-25 ASSESSMENT — PAIN DESCRIPTION - FREQUENCY: FREQUENCY: CONTINUOUS

## 2022-07-25 ASSESSMENT — PAIN DESCRIPTION - LOCATION: LOCATION: BACK;BUTTOCKS;LEG

## 2022-07-25 ASSESSMENT — PAIN DESCRIPTION - PAIN TYPE: TYPE: CHRONIC PAIN

## 2022-07-25 ASSESSMENT — PAIN DESCRIPTION - ONSET: ONSET: PROGRESSIVE

## 2022-07-25 ASSESSMENT — PAIN DESCRIPTION - ORIENTATION: ORIENTATION: RIGHT;LEFT

## 2022-07-25 NOTE — PLAN OF CARE
[facility]    [x] [place]  Phone: [phone]  Fax: [phone]      [] [place]  Phone: [phone]  Fax: [phone]        To:        Patient: Jada Carter  : 1960   MRN: [id number]  Evaluation Date: 2022      Diagnosis Information:  Diagnosis: M54.16 lumbar radiculopathy   Treatment Diagnosis: M54.16 lumbar radiculopathy     Physical Therapy Certification Form  Dear Kishan Zuluaga  The following patient has been evaluated for physical therapy services and for therapy to continue, Medicare requires monthly physician review of the treatment plan. Please review the attached evaluation and/or summary of the patient's plan of care, and verify that you agree therapy should continue by signing the attached document and sending it back to our office.     Plan of Care/Treatment to date:  [x] Therapeutic Exercise    [] Modalities:  [] Therapeutic Activity     [] Ultrasound  [x] Electrical Stimulation  [] Gait Training      [] Cervical Traction [] Lumbar Traction  [] Neuromuscular Re-education    [] Cold/hotpack [] Iontophoresis   [x] Instruction in HEP     Other:  [x] Manual Therapy      []             [] Aquatic Therapy      []                 Goals:  Short Term Goals  Time Frame for Short term goals: 1 week  Short term goal 1: start HEP    Long Term Goals  Time Frame for Long term goals : 4 weeks  Long term goal 1: Pain controlled at 3/10 to allow regular ADL  Long term goal 2: Able to continue with regular scheduled work duties  Long term goal 3: Standing tolerance increase to 45-60 min  Long term goal 4: Minimize the need for progression into invasive procedures    Frequency/Duration:22 - 22  # Days per week: [] 1 day # Weeks: [] 1 week [] 5 weeks     [x] 2 days   [] 2 weeks [] 6 weeks     [] 3 days   [] 3 weeks [] 7 weeks     [] 4 days   [x] 4 weeks [] 8 weeks    Rehab Potential: [] Excellent [x] Good [] Fair  [] Poor     Electronically signed by:  Costa Fierro PT      If you have any questions or concerns, please don't hesitate to call.   Thank you for your referral.      Physician Signature:________________________________Date:__________________  By signing above, therapists plan is approved by physician

## 2022-07-25 NOTE — PROGRESS NOTES
Physical Therapy  Initial Assessment  Date: 2022  Patient Name: Sandeep Carmichael  MRN: [id number]  : 1960    Referring Physician: MD Rosa Mancia   PCP: RY Dyson     Medical Diagnosis: Radiculopathy, lumbar region [M54.16] M54.16 lumbar radiculopathy  Treatment Diagnosis: M54.16 lumbar radiculopathy      Insurance: Payor: Efrain Baptiste / Plan: [facility] - [place] PPO / Product Type: *No Product type* /   Insurance ID: [id number] - (Fara Monte)      Restrictions:- none       Subjective:   General  Chart Reviewed: Yes  Patient Assessed for Rehabilitation Services: Yes  History obtained from[de-identified] Patient, Chart Review  Diagnosis: M54.16 lumbar radiculopathy  Referring Provider (secondary): Rosa Hart Commands: Within Functional Limits  General Comment  Comments: No results from chiropractic over the years  PT Visit Information  Onset Date: 22  PT Insurance Information: [facility]  Subjective  Subjective: Has had tingling, pain in legs a long time. No history of surgery.  No recent injury  Prior diagnostic testing[de-identified] CT Scan  Previous treatments prior to current episode?: Chiropractor  Pain Screening  Patient Currently in Pain: Yes  Pain Assessment: 0-10  Pain Level: 5  Best Pain Level: 5  Worst Pain Level: 9  Patient's Stated Pain Goal: 3  Pain Type: Chronic pain  Pain Location: Back, Buttocks, Leg  Pain Orientation: Right, Left  Pain Radiating Towards: Legs and feet  Pain Descriptors: Aching, Tingling, Pins and needles, Numbness  Pain Frequency: Continuous  Pain Onset: Progressive  Functional Pain Assessment: Prevents or interferes with all active and some passive activities  Aggravating factors: Walking, Standing       Vision/Hearing: WNL       Orientation:  Orientation  Overall Orientation Status: Within Normal Limits  Follows Commands: Within Functional Limits    Social History:  Social History  Lives With: Spouse  Home Layout: Two level    Functional Status:  Functional Status  Occupation: Full time employment  Type of Occupation: [email or web address]  Job Duties: Repetitive lifting;Prolonged standing  ADL Assistance: Independent  Homemaking Assistance: Independent  Ambulation Assistance: Independent  Transfer Assistance: Independent  Active : Yes    Objective:     Spine  Lumbar: Flexion mod loss to the knees. Extension major loss of 50%. B side glide mod loss. Special Tests: FIS, RFIS increase, worse. EIS, DON produce, no worse. KRUNAL, RFIL produce, no worse. EIL, REIL decrease, better, central, and increased ROM    Strength RLE  Comment: Difficulty toe and heel walk  Strength LLE  Comment: Difficulty toe & heel walk     Additional Measures  Special Tests: B Slump + pain to calf  Other: B SLR 60 deg        Transfers  Sit to Stand: Independent  Comment: No UE push off needed     Assessment:    Conditions Requiring Skilled Therapeutic Intervention  Body Structures, Functions, Activity Limitations Requiring Skilled Therapeutic Intervention: Decreased ROM; Decreased tolerance to work activity; Increased pain  Therapy Prognosis: Good  Treatment Diagnosis: M54.16 lumbar radiculopathy  Referring Provider (secondary): Rosa  Activity Tolerance  Activity Tolerance: Patient tolerated treatment well  Activity Tolerance: Patient tolerated treatment well         Plan:    Plan  Plan weeks: 4  Current Treatment Recommendations: Strengthening, ROM, Manual Therapy - Joint Manipulation, Modalities, Home exercise program    OutComes Score:  Oswestry CMS Modifier: CK (07/25/22 0938)  Oswestry Disability Scores %: 40 (07/25/22 0938)            Goals:  Short Term Goals  Time Frame for Short term goals: 1 week  Short term goal 1: start HEP  Long Term Goals  Time Frame for Long term goals : 4 weeks  Long term goal 1: Pain controlled at 3/10 to allow regular ADL  Long term goal 2: Able to continue with regular scheduled work duties  Long term goal 3: Standing tolerance increase to 45-60 min  Long term goal 4: Minimize the need for progression into invasive procedures       Therapy Time:   Individual Concurrent Group Co-treatment   Time In  9:00         Time Out  9:44         Minutes  44                 Leverette Mcburney, [address] [facility]

## 2022-07-25 NOTE — PROGRESS NOTES
improving balance, coordination, kinesthetic sense, posture, motor skill, proprioception.  (47881)    Manual Treatments:    [] Provided manual therapy to mobilize soft tissue/joints for the purpose of modulating pain, promoting relaxation,  increasing ROM, reducing/eliminating soft tissue swelling/inflammation/restriction, improving soft tissue extensibility. (05200)    Service Based Modalities:  Eval 40'    Timed Code Treatment Minutes:        Total Treatment Minutes:   40'    Treatment/Activity Tolerance:  [x] Patient tolerated treatment well [] Patient limited by fatique  [] Patient limited by pain  [] Patient limited by other medical complications  [] Other:     Prognosis: [x] Good [] Fair  [] Poor    Patient Requires Follow-up: [x] Yes  [] No      Goals:  Short Term Goals  Time Frame for Short term goals: 1 week  Short term goal 1: start HEP    Long Term Goals  Time Frame for Long term goals : 4 weeks  Long term goal 1: Pain controlled at 3/10 to allow regular ADL  Long term goal 2: Able to continue with regular scheduled work duties  Long term goal 3: Standing tolerance increase to 45-60 min  Long term goal 4: Minimize the need for progression into invasive procedures          Plan:   [] Continue per plan of care [] Alter current plan (see comments)  [x] Plan of care initiated [] Hold pending MD visit [] Discharge  Plan for Next Session:  [facility] [place]    Electronically signed by:  Yolanda Barboza PT,PT

## 2022-07-27 ENCOUNTER — HOSPITAL ENCOUNTER (OUTPATIENT)
Dept: PHYSICAL THERAPY | Age: 62
Setting detail: THERAPIES SERIES
Discharge: HOME | End: 2022-07-27
Payer: COMMERCIAL

## 2022-07-27 PROCEDURE — G0283 ELEC STIM OTHER THAN WOUND: HCPCS

## 2022-07-27 PROCEDURE — 97110 THERAPEUTIC EXERCISES: CPT

## 2022-07-27 NOTE — PROGRESS NOTES
Physical Therapy    Physical Therapy Daily Treatment Note    Date:  2022    Patient Name:  Maine Cates    :  1960  MRN: [id number]  Restrictions/Precautions:     Medical/Treatment Diagnosis Information:   Diagnosis: M54.16 lumbar radiculopathy  Treatment Diagnosis: M54.16 lumbar radiculopathy  Insurance/Certification information:  PT Insurance Information: BCBS  Physician Information:   [address] [place] of care signed (Y/N):  y  Visit# / total visits:  2/10  Pain level: 6-7/10       Time In:700   Time Out: 738  Progress Note: []  Yes  [x]  No  Next due by: Visit #10, or 22      Subjective: Pain of 6-7/10 this date. Completed HEP       Objective: GEE per flowsheet to decrease pain and radicular symptoms. Educated on posture, proper technique with exs. Patient able to progress through prone based ex with no noted radicaulr symptoms. IFC in prone to decrease pain. Observation:   Test measurements:      Exercises:   Exercise/Equipment Resistance/Repetitions Other comments   Lay prone 3'    Prone on elbows 3'    Press up 10x x 2     B PKF 10x x 2     Hip ext in prone 10x         Modified extension 10x    Back bend 10x          Hip abd, ext  At counter                       [x] Provided verbal/tactile cueing for activities related to strengthening, flexibility, endurance, ROM. (38665)  [] Provided verbal/tactile cueing for activities related to improving balance, coordination, kinesthetic sense, posture, motor skill, proprioception. (85652)    Therapeutic Activities:     [] Therapeutic activities, direct (one-on-one) patient contact (use of dynamic activities to improve functional performance). (93821)    Gait:   [] Provided training and instruction to the patient for ambulation re-education.  (82658)    Self-Care/ADL's  [] Self-care/home management training and compensatory training, meal preparation, safety procedures, and instructions in use of assistive technology devices/adaptive equipment,

## 2022-08-01 ENCOUNTER — HOSPITAL ENCOUNTER (OUTPATIENT)
Dept: PHYSICAL THERAPY | Age: 62
Setting detail: THERAPIES SERIES
Discharge: HOME | End: 2022-08-01
Payer: COMMERCIAL

## 2022-08-01 PROCEDURE — G0283 ELEC STIM OTHER THAN WOUND: HCPCS | Performed by: PHYSICAL THERAPY ASSISTANT

## 2022-08-01 PROCEDURE — 97110 THERAPEUTIC EXERCISES: CPT | Performed by: PHYSICAL THERAPY ASSISTANT

## 2022-08-01 NOTE — PROGRESS NOTES
Physical Therapy    Physical Therapy Daily Treatment Note    Date:  2022    Patient Name:  Jada Carter    :  1960  MRN: [id number]  Restrictions/Precautions:     Medical/Treatment Diagnosis Information:   Diagnosis: M54.16 lumbar radiculopathy  Treatment Diagnosis: M54.16 lumbar radiculopathy  Insurance/Certification information:  PT Insurance Information: BCBS  Physician Information:   [address] [place] [place] of care signed (Y/N):  y  Visit# / total visits:  310  Pain level: 6/10       Time In: 164  Time Out:     Progress Note: []  Yes  [x]  No  Next due by: Visit #10, or 22      Subjective: Pain of 6/10. No radicular pain into LE's noted this date. Patient notes ongoing shoulder pain, feels due to work duties. Objective: GEE per flowsheet to decrease pain and discomfort to allow ease with daily activities and work duties. Initiated and advanced several exercises to improve motion and decrease pain. Verbal cuing for progression, technique with exercises. Encouraged increased use of prone HEP with understanding noted. General soreness noted afterward. IFC in prone to decrease pain. Observation:   Test measurements:  Able to achieve full lumbar extension with prone press up.      Exercises:   Exercise/Equipment Resistance/Repetitions Other comments   Lay prone 5'    Prone on elbows 5'    Press up 10x x 2     B PKF 10x x 2     Hip ext in prone 10x2         Bridge 10x  Narrow, wide   Supine hip abd / Add 15x Ball, Band        Modified extension 10x    Back bend 10x          Hip abd, ext, HS curls 10x At counter                       [x] Provided verbal/tactile cueing for activities related to strengthening, flexibility, endurance, ROM. (18191)  [] Provided verbal/tactile cueing for activities related to improving balance, coordination, kinesthetic sense, posture, motor skill, proprioception. (61064)    Therapeutic Activities:     [] Therapeutic activities, direct (one-on-one) patient contact (use of dynamic activities to improve functional performance). (02267)    Gait:   [] Provided training and instruction to the patient for ambulation re-education. (34960)    Self-Care/ADL's  [] Self-care/home management training and compensatory training, meal preparation, safety procedures, and instructions in use of assistive technology devices/adaptive equipment, direct one-on-one contact. (47375)    Home Exercise Program:  Lumbar extension progression for post derangement   [x] Reviewed/Progressed HEP activities related to strengthening, flexibility, endurance, ROM. (66027)  [] Reviewed/Progressed HEP activities related to improving balance, coordination, kinesthetic sense, posture, motor skill, proprioception.  (12820)    Manual Treatments:    [] Provided manual therapy to mobilize soft tissue/joints for the purpose of modulating pain, promoting relaxation,  increasing ROM, reducing/eliminating soft tissue swelling/inflammation/restriction, improving soft tissue extensibility.  (24202)    Service Based Modalities: 15 IFC to the lumbar spine to decrease pain and muscle tightness     Timed Code Treatment Minutes:   45' ex     Total Treatment Minutes:   48'    Treatment/Activity Tolerance:  [x] Patient tolerated treatment well [] Patient limited by fatique  [] Patient limited by pain  [] Patient limited by other medical complications  [] Other:     Prognosis: [x] Good [] Fair  [] Poor    Patient Requires Follow-up: [x] Yes  [] No      Goals:  Short Term Goals  Time Frame for Short term goals: 1 week  Short term goal 1: start HEP (initiated)    Long Term Goals  Time Frame for Long term goals : 4 weeks  Long term goal 1: Pain controlled at 3/10 to allow regular ADL  Long term goal 2: Able to continue with regular scheduled work duties  Long term goal 3: Standing tolerance increase to 45-60 min  Long term goal 4: Minimize the need for progression into invasive procedures    Plan:   [x] Continue per plan of care [] Popeye Mireles current plan (see comments)  [] Plan of care initiated [] Hold pending MD visit [] Discharge    Plan for Next Session:   Monitor tolerance and advance as able. Electronically signed by:   Melani Barlow PTA

## 2022-08-03 ENCOUNTER — HOSPITAL ENCOUNTER (OUTPATIENT)
Dept: PHYSICAL THERAPY | Age: 62
Setting detail: THERAPIES SERIES
Discharge: HOME | End: 2022-08-03
Payer: COMMERCIAL

## 2022-08-03 PROCEDURE — G0283 ELEC STIM OTHER THAN WOUND: HCPCS

## 2022-08-03 PROCEDURE — 97110 THERAPEUTIC EXERCISES: CPT

## 2022-08-03 NOTE — PROGRESS NOTES
Physical Therapy    Physical Therapy Daily Treatment Note    Date:  8/3/2022    Patient Name:  Gustavo Martins    :  1960  MRN: [id number]  Restrictions/Precautions:     Medical/Treatment Diagnosis Information:   Diagnosis: M54.16 lumbar radiculopathy  Treatment Diagnosis: M54.16 lumbar radiculopathy  Insurance/Certification information:  PT Insurance Information: BCBS  Physician Information:   [address] [place] of care signed (Y/N):  y  Visit# / total visits:  4/10  Pain level: 5/10       Time In: 702  Time Out: 750    Progress Note: []  Yes  [x]  No  Next due by: Visit #10, or 22      Subjective: Pain of 5/10. No radicular pain currently. Patient noting numbness/tingling of the toes last evening. Patient states completing standing exs, has not been complteting prone exs. Educated to add prone exs before and after work. Objective: GEE per flowsheet to decrease pain and discomfort to allow ease with daily activities and work duties. Initiated and advanced several exercises to improve motion and decrease pain. Verbal cuing for progression, technique with exercises. Encouraged increased use of prone HEP with understanding noted. General soreness noted afterward. IFC in prone to decrease pain. Observation:   Test measurements:  Able to achieve full lumbar extension with prone press up.      Exercises:   Exercise/Equipment Resistance/Repetitions Other comments   Lay prone 5'    Prone on elbows 5'    Press up 10x x 2     B PKF 10x x 2     Hip ext in prone 10x2         Bridge readd  Narrow, wide   Supine hip abd / Add readd  Ball, Band        Modified extension 10x x 2     Back bend 10x x 2           Hip abd, ext, HS curls, HR/TR  10x At counter                       [x] Provided verbal/tactile cueing for activities related to strengthening, flexibility, endurance, ROM. (01546)  [] Provided verbal/tactile cueing for activities related to improving balance, coordination, kinesthetic sense, posture, motor skill, proprioception. (42566)    Therapeutic Activities:     [] Therapeutic activities, direct (one-on-one) patient contact (use of dynamic activities to improve functional performance). (36312)    Gait:   [] Provided training and instruction to the patient for ambulation re-education. (21162)    Self-Care/ADL's  [] Self-care/home management training and compensatory training, meal preparation, safety procedures, and instructions in use of assistive technology devices/adaptive equipment, direct one-on-one contact. (28567)    Home Exercise Program:  Lumbar extension progression for post derangement   [x] Reviewed/Progressed HEP activities related to strengthening, flexibility, endurance, ROM. (88213)  [] Reviewed/Progressed HEP activities related to improving balance, coordination, kinesthetic sense, posture, motor skill, proprioception.  (04904)    Manual Treatments:    [] Provided manual therapy to mobilize soft tissue/joints for the purpose of modulating pain, promoting relaxation,  increasing ROM, reducing/eliminating soft tissue swelling/inflammation/restriction, improving soft tissue extensibility.  (91729)    Service Based Modalities: 15 IFC to the lumbar spine to decrease pain and muscle tightness     Timed Code Treatment Minutes:   35' ex     Total Treatment Minutes:   50'    Treatment/Activity Tolerance:  [x] Patient tolerated treatment well [] Patient limited by fatique  [] Patient limited by pain  [] Patient limited by other medical complications  [] Other:     Prognosis: [x] Good [] Fair  [] Poor    Patient Requires Follow-up: [x] Yes  [] No      Goals:  Short Term Goals  Time Frame for Short term goals: 1 week  Short term goal 1: start HEP (initiated)    Long Term Goals  Time Frame for Long term goals : 4 weeks  Long term goal 1: Pain controlled at 3/10 to allow regular ADL  Long term goal 2: Able to continue with regular scheduled work duties  Long term goal 3: Standing tolerance increase to 45-60 min  Long term goal 4: Minimize the need for progression into invasive procedures    Plan:   [x] Continue per plan of care [] Alter current plan (see comments)  [] Plan of care initiated [] Hold pending MD visit [] Discharge    Plan for Next Session:   Monitor tolerance and advance as able.      Electronically signed by:  Efrain Patterson PT

## 2022-08-08 ENCOUNTER — HOSPITAL ENCOUNTER (OUTPATIENT)
Dept: PHYSICAL THERAPY | Age: 62
Setting detail: THERAPIES SERIES
Discharge: HOME | End: 2022-08-08
Payer: COMMERCIAL

## 2022-08-08 PROCEDURE — G0283 ELEC STIM OTHER THAN WOUND: HCPCS | Performed by: PHYSICAL THERAPY ASSISTANT

## 2022-08-08 PROCEDURE — 97110 THERAPEUTIC EXERCISES: CPT | Performed by: PHYSICAL THERAPY ASSISTANT

## 2022-08-08 NOTE — PROGRESS NOTES
Physical Therapy    Physical Therapy Daily Treatment Note    Date:  2022    Patient Name:  Hazel Fine    :  1960  MRN: [id number]  Restrictions/Precautions:     Medical/Treatment Diagnosis Information:   Diagnosis: M54.16 lumbar radiculopathy  Treatment Diagnosis: M54.16 lumbar radiculopathy  Insurance/Certification information:  PT Insurance Information: BCBS  Physician Information:   [address] [place] of care signed (Y/N):  y  Visit# / total visits:  5/10  Pain level: 4/10       Time In: 404  Time Out: 750    Progress Note: []  Yes  [x]  No  Next due by: Visit #10, or 22      Subjective: Pain of /10. No radicular pain currently. Patient noting numbness/tingling from right knee to toes this date. Objective: GEE per flowsheet to decrease pain and discomfort to allow ease with daily activities and work duties. Initiated and advanced several exercises to improve motion and decrease pain. Verbal cuing for progression, technique with exercises. Encouraged increased use of prone HEP with understanding noted. General soreness noted afterward. IFC in prone to decrease pain. Observation: Difficulty with posterior pelvic tilt exercise noted.    Test measurements:  Lumbar ext in standin°    Exercises:   Exercise/Equipment Resistance/Repetitions Other comments   Lay prone 5'    Prone on elbows 5'    Press up 10x x 2     B PKF 10x x 2     Hip ext in prone 10x2         Bridge 10x   Narrow, wide   Supine hip abd / Add 15x   Ball, Band   Pelvic Tilt 10x         Modified extension 10x x 2     Back bend 10x x 2           Hip abd, ext, HS curls, HR/TR  15x At counter                       [x] Provided verbal/tactile cueing for activities related to strengthening, flexibility, endurance, ROM. (11152)  [] Provided verbal/tactile cueing for activities related to improving balance, coordination, kinesthetic sense, posture, motor skill, proprioception. (81370)    Therapeutic Activities:     [] Therapeutic activities, direct (one-on-one) patient contact (use of dynamic activities to improve functional performance). (57411)    Gait:   [] Provided training and instruction to the patient for ambulation re-education. (65059)    Self-Care/ADL's  [] Self-care/home management training and compensatory training, meal preparation, safety procedures, and instructions in use of assistive technology devices/adaptive equipment, direct one-on-one contact. (42181)    Home Exercise Program:  Lumbar extension progression for post derangement   [x] Reviewed/Progressed HEP activities related to strengthening, flexibility, endurance, ROM. (48368)  [] Reviewed/Progressed HEP activities related to improving balance, coordination, kinesthetic sense, posture, motor skill, proprioception.  (94315)    Manual Treatments:    [] Provided manual therapy to mobilize soft tissue/joints for the purpose of modulating pain, promoting relaxation,  increasing ROM, reducing/eliminating soft tissue swelling/inflammation/restriction, improving soft tissue extensibility.  (55508)    Service Based Modalities: 15 IFC to the lumbar spine to decrease pain and muscle tightness     Timed Code Treatment Minutes:   36' ex     Total Treatment Minutes:   54'    Treatment/Activity Tolerance:  [x] Patient tolerated treatment well [] Patient limited by fatique  [] Patient limited by pain  [] Patient limited by other medical complications  [] Other:     Prognosis: [x] Good [] Fair  [] Poor    Patient Requires Follow-up: [x] Yes  [] No      Goals:  Short Term Goals  Time Frame for Short term goals: 1 week  Short term goal 1: start HEP (initiated)    Long Term Goals  Time Frame for Long term goals : 4 weeks  Long term goal 1: Pain controlled at 3/10 to allow regular ADL  Long term goal 2: Able to continue with regular scheduled work duties  Long term goal 3: Standing tolerance increase to 45-60 min  Long term goal 4: Minimize the need for progression into invasive procedures    Plan:   [x] Continue per plan of care [] Alter current plan (see comments)  [] Plan of care initiated [] Hold pending MD visit [] Discharge    Plan for Next Session:   Monitor tolerance and advance as able. Electronically signed by:   Feli Biswas PTA

## 2022-08-09 NOTE — PROGRESS NOTES
I have reviewed and agree to the content of the note written by the PTA.   Electronically signed by Pedro Coats PT [id number]

## 2022-08-10 ENCOUNTER — HOSPITAL ENCOUNTER (OUTPATIENT)
Dept: PHYSICAL THERAPY | Age: 62
Setting detail: THERAPIES SERIES
Discharge: HOME | End: 2022-08-10
Payer: COMMERCIAL

## 2022-08-10 PROCEDURE — G0283 ELEC STIM OTHER THAN WOUND: HCPCS | Performed by: PHYSICAL THERAPY ASSISTANT

## 2022-08-10 PROCEDURE — 97110 THERAPEUTIC EXERCISES: CPT | Performed by: PHYSICAL THERAPY ASSISTANT

## 2022-08-10 NOTE — PROGRESS NOTES
endurance, ROM. (87332)  [] Provided verbal/tactile cueing for activities related to improving balance, coordination, kinesthetic sense, posture, motor skill, proprioception. (77188)    Therapeutic Activities:     [] Therapeutic activities, direct (one-on-one) patient contact (use of dynamic activities to improve functional performance). (26624)    Gait:   [] Provided training and instruction to the patient for ambulation re-education. (46567)    Self-Care/ADL's  [] Self-care/home management training and compensatory training, meal preparation, safety procedures, and instructions in use of assistive technology devices/adaptive equipment, direct one-on-one contact. (97620)    Home Exercise Program:  Lumbar extension progression for post derangement   [x] Reviewed/Progressed HEP activities related to strengthening, flexibility, endurance, ROM. (81518)  [] Reviewed/Progressed HEP activities related to improving balance, coordination, kinesthetic sense, posture, motor skill, proprioception.  (77782)    Manual Treatments:    [] Provided manual therapy to mobilize soft tissue/joints for the purpose of modulating pain, promoting relaxation,  increasing ROM, reducing/eliminating soft tissue swelling/inflammation/restriction, improving soft tissue extensibility.  (02737)    Service Based Modalities: 15 IFC to the lumbar spine to decrease pain and muscle tightness     Timed Code Treatment Minutes:   36' ex     Total Treatment Minutes:   54'    Treatment/Activity Tolerance:  [x] Patient tolerated treatment well [] Patient limited by fatique  [] Patient limited by pain  [] Patient limited by other medical complications  [] Other:     Prognosis: [x] Good [] Fair  [] Poor    Patient Requires Follow-up: [x] Yes  [] No      Goals:  Short Term Goals  Time Frame for Short term goals: 1 week  Short term goal 1: start HEP (initiated)    Long Term Goals  Time Frame for Long term goals : 4 weeks  Long term goal 1: Pain controlled at 3/10

## 2022-08-15 ENCOUNTER — HOSPITAL ENCOUNTER (OUTPATIENT)
Dept: PHYSICAL THERAPY | Age: 62
Setting detail: THERAPIES SERIES
Discharge: HOME | End: 2022-08-15
Payer: COMMERCIAL

## 2022-08-15 PROCEDURE — 97110 THERAPEUTIC EXERCISES: CPT | Performed by: PHYSICAL THERAPY ASSISTANT

## 2022-08-15 PROCEDURE — G0283 ELEC STIM OTHER THAN WOUND: HCPCS | Performed by: PHYSICAL THERAPY ASSISTANT

## 2022-08-15 NOTE — PROGRESS NOTES
I have reviewed and agree to the content of the note written by the PTA.   Electronically signed by Claudia Soto PT [id number]

## 2022-08-15 NOTE — PROGRESS NOTES
Physical Therapy    Physical Therapy Daily Treatment Note    Date:  8/15/2022    Patient Name:  Echo Falcon    :  1960  MRN: [id number]  Restrictions/Precautions:     Medical/Treatment Diagnosis Information:   Diagnosis: M54.16 lumbar radiculopathy  Treatment Diagnosis: M54.16 lumbar radiculopathy  Insurance/Certification information:  PT Insurance Information: BCBS  Physician Information:   [address] [place] of care signed (Y/N):  y  Visit# / total visits:  7/10  Pain level: 4/10       Time In: 793  Time Out: 754    Progress Note: []  Yes  [x]  No  Next due by: Visit #10, or 22      Subjective: Pain of 4/10 in low back this date. Notes numbness into right toes. Notes has restarting wearing compression socks. Objective: GEE per flowsheet to decrease pain and discomfort to allow ease with daily activities and work duties. Initiated and advanced several exercises to improve motion and decrease pain. Verbal cuing for progression, technique with exercises. Encouraged increased use of prone HEP with understanding noted. General soreness noted afterward. IFC in prone to decrease pain. Observation: Difficulty with posterior pelvic tilt exercise remains but improved this date. No radicular pain this date.    Test measurements:     Exercises:   Exercise/Equipment Resistance/Repetitions Other comments   Lay prone 5'    Prone on elbows 5'    Press up 10x x 2     B PKF 10x x 2     Hip ext in prone 10x2    TM 5' RETRO        Bridge 10x   Narrow, wide   Supine hip abd / Add 15x   Ball, Band   Pelvic Tilt 10x         Modified extension 10x x 2     Back bend 10x x 2           Hip abd, ext, HS curls, HR/TR  15x At counter   T-band Rows, ext 15x ea BLUE   SPO 15x ea Blue             [x] Provided verbal/tactile cueing for activities related to strengthening, flexibility, endurance, ROM. (93997)  [] Provided verbal/tactile cueing for activities related to improving balance, coordination, kinesthetic sense, posture, motor skill, proprioception. (99217)    Therapeutic Activities:     [] Therapeutic activities, direct (one-on-one) patient contact (use of dynamic activities to improve functional performance). (00230)    Gait:   [] Provided training and instruction to the patient for ambulation re-education. (25762)    Self-Care/ADL's  [] Self-care/home management training and compensatory training, meal preparation, safety procedures, and instructions in use of assistive technology devices/adaptive equipment, direct one-on-one contact. (17305)    Home Exercise Program:  Lumbar extension progression for post derangement   [x] Reviewed/Progressed HEP activities related to strengthening, flexibility, endurance, ROM. (44141)  [] Reviewed/Progressed HEP activities related to improving balance, coordination, kinesthetic sense, posture, motor skill, proprioception.  (61346)    Manual Treatments:    [] Provided manual therapy to mobilize soft tissue/joints for the purpose of modulating pain, promoting relaxation,  increasing ROM, reducing/eliminating soft tissue swelling/inflammation/restriction, improving soft tissue extensibility.  (20250)    Service Based Modalities: 15 IFC to the lumbar spine to decrease pain and muscle tightness     Timed Code Treatment Minutes:   40' ex     Total Treatment Minutes:   54'    Treatment/Activity Tolerance:  [x] Patient tolerated treatment well [] Patient limited by fatique  [] Patient limited by pain  [] Patient limited by other medical complications  [] Other:     Prognosis: [x] Good [] Fair  [] Poor    Patient Requires Follow-up: [x] Yes  [] No      Goals:  Short Term Goals  Time Frame for Short term goals: 1 week  Short term goal 1: start HEP (initiated)    Long Term Goals  Time Frame for Long term goals : 4 weeks  Long term goal 1: Pain controlled at 3/10 to allow regular ADL (6/10 this date)  Long term goal 2: Able to continue with regular scheduled work duties (Continues to work but with varying pain)  Long term goal 3: Standing tolerance increase to 45-60 min  Long term goal 4: Minimize the need for progression into invasive procedures    Plan:   [x] Continue per plan of care [] Alter current plan (see comments)  [] Plan of care initiated [] Hold pending MD visit [] Discharge    Plan for Next Session:   Monitor tolerance and advance as able. Electronically signed by:   Feli Biswas PTA

## 2022-08-17 ENCOUNTER — HOSPITAL ENCOUNTER (OUTPATIENT)
Dept: PHYSICAL THERAPY | Age: 62
Setting detail: THERAPIES SERIES
Discharge: HOME | End: 2022-08-17
Payer: COMMERCIAL

## 2022-08-17 PROCEDURE — 97110 THERAPEUTIC EXERCISES: CPT | Performed by: PHYSICAL THERAPY ASSISTANT

## 2022-08-17 PROCEDURE — G0283 ELEC STIM OTHER THAN WOUND: HCPCS | Performed by: PHYSICAL THERAPY ASSISTANT

## 2022-08-17 NOTE — PROGRESS NOTES
Physical Therapy    Physical Therapy Daily Treatment Note    Date:  2022    Patient Name:  Juanita Knight    :  1960  MRN: [id number]  Restrictions/Precautions:     Medical/Treatment Diagnosis Information:   Diagnosis: M54.16 lumbar radiculopathy  Treatment Diagnosis: M54.16 lumbar radiculopathy  Insurance/Certification information:  PT Insurance Information: BCBS  Physician Information:   [address] [place] of care signed (Y/N):  y  Visit# / total visits:  7/10  Pain level: 3-4/10       Time In: 655 Time Out: 757    Progress Note: []  Yes  [x]  No  Next due by: Visit #10, or 22      Subjective: Pain of 3-4/10 in low back this date. Notes numbness, pain into right toes. Notes has restarting wearing compression socks. Performing HEP as able. Notes improvement in pain with standing exercises while working. Objective: GEE per flowsheet to decrease pain and discomfort to allow ease with daily activities and work duties. Initiated and advanced several exercises to improve motion and decrease pain. Verbal cuing for progression, technique with exercises. Encouraged increased use of prone HEP with understanding noted. General soreness noted afterward. IFC in prone to decrease pain. Observation: Difficulty with posterior pelvic tilt exercise remains but improved this date. No radicular pain this date.    Test measurements:     Exercises:   Exercise/Equipment Resistance/Repetitions Other comments   Lay prone 5'    Prone on elbows 5'    Press up 10x x 2     B PKF 10x x 2     Hip ext in prone 10x2    Quadruped LE, cat/camel, multifidus 10x ea         TM 5' RETRO        Bridge 10x   Narrow, wide   Supine hip abd / Add 15x   Ball, Band   Pelvic Tilt 10x         Modified extension 10x x 2     Back bend 10x x 2           Hip abd, ext, HS curls, HR/TR  15x At counter   T-band Rows, ext 15x ea BLUE   SPO 15x ea Blue             [x] Provided verbal/tactile cueing for activities related to strengthening, flexibility, endurance, ROM. (70985)  [] Provided verbal/tactile cueing for activities related to improving balance, coordination, kinesthetic sense, posture, motor skill, proprioception. (01759)    Therapeutic Activities:     [] Therapeutic activities, direct (one-on-one) patient contact (use of dynamic activities to improve functional performance). (92817)    Gait:   [] Provided training and instruction to the patient for ambulation re-education. (56999)    Self-Care/ADL's  [] Self-care/home management training and compensatory training, meal preparation, safety procedures, and instructions in use of assistive technology devices/adaptive equipment, direct one-on-one contact. (74910)    Home Exercise Program:  Lumbar extension progression for post derangement   [x] Reviewed/Progressed HEP activities related to strengthening, flexibility, endurance, ROM. (87970)  [] Reviewed/Progressed HEP activities related to improving balance, coordination, kinesthetic sense, posture, motor skill, proprioception.  (10711)    Manual Treatments:    [] Provided manual therapy to mobilize soft tissue/joints for the purpose of modulating pain, promoting relaxation,  increasing ROM, reducing/eliminating soft tissue swelling/inflammation/restriction, improving soft tissue extensibility.  (87469)    Service Based Modalities: 15 IFC to the lumbar spine to decrease pain and muscle tightness     Timed Code Treatment Minutes:   52' ex     Total Treatment Minutes:   58'    Treatment/Activity Tolerance:  [x] Patient tolerated treatment well [] Patient limited by fatique  [] Patient limited by pain  [] Patient limited by other medical complications  [] Other:     Prognosis: [x] Good [] Fair  [] Poor    Patient Requires Follow-up: [x] Yes  [] No      Goals:  Short Term Goals  Time Frame for Short term goals: 1 week  Short term goal 1: start HEP (initiated)    Long Term Goals  Time Frame for Long term goals : 4 weeks  Long term goal 1: Pain controlled at 3/10 to allow regular ADL (3-4/10 this date)  Long term goal 2: Able to continue with regular scheduled work duties (Continues to work but with varying pain)  Long term goal 3: Standing tolerance increase to 45-60 min  Long term goal 4: Minimize the need for progression into invasive procedures (None planned at this time)    Plan:   [x] Continue per plan of care [] Alter current plan (see comments)  [] Plan of care initiated [] Hold pending MD visit [] Discharge    Plan for Next Session:   Monitor tolerance and advance as able. Electronically signed by:   Tracey Kyle PTA

## 2022-08-22 ENCOUNTER — HOSPITAL ENCOUNTER (OUTPATIENT)
Dept: PHYSICAL THERAPY | Age: 62
Setting detail: THERAPIES SERIES
Discharge: HOME | End: 2022-08-22
Payer: COMMERCIAL

## 2022-08-22 PROCEDURE — 97110 THERAPEUTIC EXERCISES: CPT | Performed by: PHYSICAL THERAPY ASSISTANT

## 2022-08-22 NOTE — PROGRESS NOTES
Physical Therapy    Physical Therapy Daily Treatment Note    Date:  2022    Patient Name:  Lina Gottron    :  1960  MRN: [id number]  Restrictions/Precautions:     Medical/Treatment Diagnosis Information:   Diagnosis: M54.16 lumbar radiculopathy  Treatment Diagnosis: M54.16 lumbar radiculopathy  Insurance/Certification information:  PT Insurance Information: BCBS  Physician Information:   [address] [place] of care signed (Y/N):  y  Visit# / total visits:  8/10  Pain level: 0/10       Time In: 659 Time Out: 740    Progress Note: []  Yes  [x]  No  Next due by: Visit #10, or 22      Subjective: Pain of 0/10 this date in back and LE's. Notes having several days off from work from previous week. Patient states numbness through toes remains. Objective: GEE per flowsheet to decrease pain and discomfort to allow ease with daily activities and work duties. Initiated and advanced several exercises to improve motion and decrease pain. Verbal cuing for progression, technique with exercises. Encouraged increased use of prone HEP with understanding noted. General soreness noted afterward. Held IFC this date due to having no pain at conclusion of session. Observation:    No radicular pain this date.    Test measurements:     Exercises:   Exercise/Equipment Resistance/Repetitions Other comments   Lay prone 5'    Prone on elbows 5'    Press up 10x x 2     B PKF 10x x 2     Hip ext in prone 10x2    Quadruped LE, cat/camel, multifidus 10x ea         TM 5' RETRO        Bridge 10x   Narrow, wide   Supine hip abd / Add 20x   Ball, Band   Pelvic Tilt 10x         Modified extension 10x x 2     Back bend 10x x 2           Hip abd, ext, HS curls, HR/TR  15x At counter   T-band Rows, ext 20x ea BLUE   SPO 20x ea Blue             [x] Provided verbal/tactile cueing for activities related to strengthening, flexibility, endurance, ROM. ()  [] Provided verbal/tactile cueing for activities related to improving balance, coordination, kinesthetic sense, posture, motor skill, proprioception. (47441)    Therapeutic Activities:     [] Therapeutic activities, direct (one-on-one) patient contact (use of dynamic activities to improve functional performance). (82736)    Gait:   [] Provided training and instruction to the patient for ambulation re-education. (73263)    Self-Care/ADL's  [] Self-care/home management training and compensatory training, meal preparation, safety procedures, and instructions in use of assistive technology devices/adaptive equipment, direct one-on-one contact. (98766)    Home Exercise Program:  Lumbar extension progression for post derangement   [x] Reviewed/Progressed HEP activities related to strengthening, flexibility, endurance, ROM. (92098)  [] Reviewed/Progressed HEP activities related to improving balance, coordination, kinesthetic sense, posture, motor skill, proprioception.  (59300)    Manual Treatments:    [] Provided manual therapy to mobilize soft tissue/joints for the purpose of modulating pain, promoting relaxation,  increasing ROM, reducing/eliminating soft tissue swelling/inflammation/restriction, improving soft tissue extensibility.  (61875)    Service Based Modalities:     Timed Code Treatment Minutes:   39' ex     Total Treatment Minutes:   39'    Treatment/Activity Tolerance:  [x] Patient tolerated treatment well [] Patient limited by fatique  [] Patient limited by pain  [] Patient limited by other medical complications  [] Other:     Prognosis: [x] Good [] Fair  [] Poor    Patient Requires Follow-up: [x] Yes  [] No      Goals:  Short Term Goals  Time Frame for Short term goals: 1 week  Short term goal 1: start HEP (initiated)    Long Term Goals  Time Frame for Long term goals : 4 weeks  Long term goal 1: Pain controlled at 3/10 to allow regular ADL (0/10 this date)  Long term goal 2: Able to continue with regular scheduled work duties (Continues to work but with varying pain)  Long term goal 3: Standing tolerance increase to 45-60 min  Long term goal 4: Minimize the need for progression into invasive procedures (None planned at this time)    Plan:   [x] Continue per plan of care [] Alter current plan (see comments)  [] Plan of care initiated [] Hold pending MD visit [] Discharge    Plan for Next Session:   Monitor tolerance and advance as able. Electronically signed by:   Jazmin Jain PTA

## 2022-08-23 NOTE — PROGRESS NOTES
I have reviewed and agree to the content of the note written by the PTA.   Electronically signed by Flor Fontaine PT [id number]

## 2022-08-23 NOTE — PROGRESS NOTES
I have reviewed and agree to the content of the note written by the PTA.   Electronically signed by Ingrid Masters PT [id number]

## 2022-08-24 ENCOUNTER — HOSPITAL ENCOUNTER (OUTPATIENT)
Dept: PHYSICAL THERAPY | Age: 62
Setting detail: THERAPIES SERIES
Discharge: HOME | End: 2022-08-24
Payer: COMMERCIAL

## 2022-08-24 PROCEDURE — 97110 THERAPEUTIC EXERCISES: CPT | Performed by: PHYSICAL THERAPIST

## 2022-08-24 NOTE — PLAN OF CARE
[facility]    [x] [place]  Phone: [phone]  Fax: [phone]      [] [place]  Phone: [phone]  Fax: [phone]    Physical Therapy Progress Note  Date: 2022        Patient Name:  Lesly Arndt    :  1960  MRN: [id number]  Restrictions/Precautions:      Medical/Treatment Diagnosis Information:   Diagnosis: M54.16 lumbar radiculopathy  Treatment Diagnosis: M54.16 lumbar radiculopathy     Insurance/Certification information:   [facility]  Physician Information:    [place]  Plan of care signed (Y/N): y  Visit# / total visits:  9  Pain level: 0/10       Plan of Care/Treatment to date:  [x] Therapeutic Exercise    [] Modalities:  [] Therapeutic Activity     [] Ultrasound  [x] Electrical Stimulation  [] Gait Training      [] Cervical Traction    [] Lumbar Traction  [] Neuromuscular Re-education  [] Cold/hotpack [] Iontophoresis  [x] Instruction in HEP      Other:  [x] Manual Therapy       []    [] Aquatic Therapy       []                           Subjective:    Has B lateral calf and foot tingle/ numbness. Constant. Back feels much better. Tolerating moving materials at work better. Objective:   Observation:    The B LE sensory problem does not change with spinal position or motion changes. Test measurements:   Lumbar extension thru full range.  Good mechanical improvement from initial status  Sciatic N flossing does have some positive effects on peripheral symptoms            Plan:    Continue 1-2 weeks       Goals:   Short Term Goals  Time Frame for Short term goals: 1 week  Short term goal 1: start HEP - met     Long Term Goals  Time Frame for Long term goals : 4 weeks  Long term goal 1: Pain controlled at 3/10 to allow regular ADL - met  Long term goal 2: Able to continue with regular scheduled work duties (Continues to work but with varying pain)  Long term goal 3: Standing tolerance increase to 45-60 min- met  Long term goal 4: Minimize the need for progression into invasive procedures (None planned at this time)           Current Frequency/Duration:8/24/22 - 9/23/22  # Days per week: [] 1 day # Weeks: [] 1 week [x] 4 weeks      [x] 2 days   [] 2 weeks [] 5 weeks      [] 3 days   [] 3 weeks [] 6 weeks     Rehab Potential: [] Excellent [x] Good [] Fair  [] Poor     Goal Status:  [] Achieved [x] Partially Achieved  [] Not Achieved     Patient Status: [] Continue per initial plan of Care     [] Patient now discharged     [x] Additional visits requested, Please re-certify for additional visits:      Requested frequency/duration:  2X/week for 2-4weeks    Electronically signed by:  Costa Fierro PT      If you have any questions or concerns, please don't hesitate to call.   Thank you for your referral.    Physician Signature:________________________________Date:__________________  By signing above, therapists plan is approved by physician

## 2022-08-24 NOTE — PROGRESS NOTES
Physical Therapy    Physical Therapy Daily Treatment Note    Date:  2022    Patient Name:  Asha Healy    :  1960  MRN: [id number]  Restrictions/Precautions:     Medical/Treatment Diagnosis Information:   Diagnosis: M54.16 lumbar radiculopathy  Treatment Diagnosis: M54.16 lumbar radiculopathy  Insurance/Certification information:  PT Insurance Information: BCBS  Physician Information:   [address] [place] of care signed (Y/N):  y  Visit# / total visits:  9/10  Pain level: 0/10       Time In: 8:12 Time Out: 8:50    Progress Note: []  Yes  [x]  No  Next due by: Visit #10, or 22      Subjective: Has B lateral calf and foot tingle/ numbness. Constant. Back feels much better. Tolerating moving materials at work better. Objective:   Observation:    The B LE sensory problem does not change with spinal position or motion changes. Test measurements:   Lumbar extension thru full range. Good mechanical improvement from initial status  Sciatic N flossing does have some positive effects on peripheral symptoms    Exercises:   Exercise/Equipment Resistance/Repetitions Other comments   Lay prone 5'    Prone on elbows 5'    Press up 10x x 2     B PKF 10x x 2     Hip ext in prone 10x2    Quadruped LE, cat/camel, multifidus 10x ea, x2    Sciatic N flossing 5'    TM  RETRO        Bridge   Narrow, wide   Supine hip abd / Add          Modified extension 10x x 2     Back bend 10x x 2           Hip abd, ext, HS curls, HR/TR  15x At counter   T-band Rows, ext  BLUE   SPO  Blue             [x] Provided verbal/tactile cueing for activities related to strengthening, flexibility, endurance, ROM. (47268)  [] Provided verbal/tactile cueing for activities related to improving balance, coordination, kinesthetic sense, posture, motor skill, proprioception. (43443)    Therapeutic Activities:     [] Therapeutic activities, direct (one-on-one) patient contact (use of dynamic activities to improve functional performance). initiated [] Hold pending MD visit [] Discharge    Plan for Next Session:  Sciatic N mobs    Electronically signed by:  Serena Schmid PT

## 2022-08-29 ENCOUNTER — HOSPITAL ENCOUNTER (OUTPATIENT)
Dept: PHYSICAL THERAPY | Age: 62
Setting detail: THERAPIES SERIES
Discharge: HOME | End: 2022-08-29
Payer: COMMERCIAL

## 2022-08-29 PROCEDURE — 97110 THERAPEUTIC EXERCISES: CPT | Performed by: PHYSICAL THERAPIST

## 2022-08-29 NOTE — PROGRESS NOTES
Physical Therapy    Physical Therapy Daily Treatment Note    Date:  2022    Patient Name:  Abdoulaye Mcgraw    :  1960  MRN: [id number]  Restrictions/Precautions:     Medical/Treatment Diagnosis Information:   Diagnosis: M54.16 lumbar radiculopathy  Treatment Diagnosis: M54.16 lumbar radiculopathy  Insurance/Certification information:  PT Insurance Information: BCBS  Physician Information:   [address] [place] of care signed (Y/N):  y  Visit# / total visits:  1/10 of 2nd POC, Total 10  Pain level: 0/10       Time In: 8:00 Time Out: 8:34    Progress Note: []  Yes  [x]  No  Next due by: Visit #10, or 22      Subjective: Still has \" feet asleep\" sensation. The back itself feels good. Gets thru work shift better. Hip and knee area pain has changed  Objective:   Observation:    The B LE sensory problem does not change with spinal position or motion changes. Test measurements:   Lumbar extension thru full range.  Good mechanical improvement from initial status  Sciatic N flossing does have some positive effects on peripheral symptoms    Exercises:   Exercise/Equipment Resistance/Repetitions Other comments   Lay prone 5'    Prone on elbows 5'    Press up 10x x 2     B PKF 10x x 2     Hip ext in prone 10x2    Quadruped LE, cat/camel, multifidus 10x ea, x2    Sciatic N flossing 5'    TM  RETRO        Bridge   Narrow, wide   Supine hip abd / Add          Modified extension 10x x 2     Back bend 10x x 2           Hip abd, ext, HS curls, HR/TR  15x At counter   T-band Rows, ext  BLUE   SPO  Blue             [x] Provided verbal/tactile cueing for activities related to strengthening, flexibility, endurance, ROM. (86559)  [] Provided verbal/tactile cueing for activities related to improving balance, coordination, kinesthetic sense, posture, motor skill, proprioception. (83949)    Therapeutic Activities:     [] Therapeutic activities, direct (one-on-one) patient contact (use of dynamic activities to improve functional performance). (06318)    Gait:   [] Provided training and instruction to the patient for ambulation re-education. (86389)    Self-Care/ADL's  [] Self-care/home management training and compensatory training, meal preparation, safety procedures, and instructions in use of assistive technology devices/adaptive equipment, direct one-on-one contact. (65295)    Home Exercise Program:  Lumbar extension progression for post derangement   [x] Reviewed/Progressed HEP activities related to strengthening, flexibility, endurance, ROM. (12284)  [] Reviewed/Progressed HEP activities related to improving balance, coordination, kinesthetic sense, posture, motor skill, proprioception.  (52415)    Manual Treatments:    [] Provided manual therapy to mobilize soft tissue/joints for the purpose of modulating pain, promoting relaxation,  increasing ROM, reducing/eliminating soft tissue swelling/inflammation/restriction, improving soft tissue extensibility.  (45189)    Service Based Modalities:     Timed Code Treatment Minutes:   29' ex     Total Treatment Minutes:   29'    Treatment/Activity Tolerance:  [x] Patient tolerated treatment well [] Patient limited by fatique  [] Patient limited by pain  [] Patient limited by other medical complications  [] Other:     Prognosis: [x] Good [] Fair  [] Poor    Patient Requires Follow-up: [x] Yes  [] No      Goals:  Short Term Goals  Time Frame for Short term goals: 1 week  Short term goal 1: start HEP - met    Long Term Goals  Time Frame for Long term goals : 4 weeks  Long term goal 1: Pain controlled at 3/10 to allow regular ADL - met  Long term goal 2: Able to continue with regular scheduled work duties (Continues to work but with varying pain)  Long term goal 3: Standing tolerance increase to 45-60 min- met  Long term goal 4: Minimize the need for progression into invasive procedures (None planned at this time)    Plan:   [x] Continue per plan of care [] Alter current plan (see comments)  [] Plan of care initiated [] Hold pending MD visit [] Discharge    Plan for Next Session:  Sciatic N mobs    Electronically signed by:  Brandi Cheney PT

## 2022-08-31 ENCOUNTER — HOSPITAL ENCOUNTER (OUTPATIENT)
Dept: PHYSICAL THERAPY | Age: 62
Setting detail: THERAPIES SERIES
Discharge: HOME | End: 2022-08-31
Payer: COMMERCIAL

## 2022-08-31 PROCEDURE — 97110 THERAPEUTIC EXERCISES: CPT | Performed by: PHYSICAL THERAPIST

## 2022-08-31 NOTE — PROGRESS NOTES
Physical Therapy    Physical Therapy Daily Treatment Note    Date:  2022    Patient Name:  Asha Healy    :  1960  MRN: [id number]  Restrictions/Precautions:     Medical/Treatment Diagnosis Information:   Diagnosis: M54.16 lumbar radiculopathy  Treatment Diagnosis: M54.16 lumbar radiculopathy  Insurance/Certification information:  PT Insurance Information: BCBS  Physician Information:   [address] [place] of care signed (Y/N):  y  Visit# / total visits:  2/10 of 2nd POC, Total 11  Pain level: 0/10       Time In: 8:00 Time Out: 8:30    Progress Note: []  Yes  [x]  No  Next due by: Visit #10, or 22      Subjective: Legs feel good after PT. LBP has mostly resolved  Objective:   Observation:    The B LE sensory problem does not change with spinal position or motion changes. Test measurements:   Lumbar extension thru full range. Good mechanical improvement from initial status  Mild stiffness at end range extension.    Sciatic N flossing does have some positive effects on peripheral symptoms, during and immediately after    Exercises:   Exercise/Equipment Resistance/Repetitions Other comments   Lay prone 4'    Prone on elbows 5'    Press up 10x x 2  Exhale OP   B PKF 10x x 2     Hip ext in prone 10x2    Quadruped LE, cat/camel, multifidus 10x ea, x2    Sciatic N flossing 5'    TM  RETRO        Bridge   Narrow, wide   Supine hip abd / Add          Modified extension 10x x 2     Back bend 10x x 2           Hip abd, ext, HS curls, HR/TR  15x At counter   T-band Rows, ext  BLUE   SPO  Blue             [x] Provided verbal/tactile cueing for activities related to strengthening, flexibility, endurance, ROM. (52784)  [] Provided verbal/tactile cueing for activities related to improving balance, coordination, kinesthetic sense, posture, motor skill, proprioception. (89754)    Therapeutic Activities:     [] Therapeutic activities, direct (one-on-one) patient contact (use of dynamic activities to improve functional performance). (41974)    Gait:   [] Provided training and instruction to the patient for ambulation re-education. (61334)    Self-Care/ADL's  [] Self-care/home management training and compensatory training, meal preparation, safety procedures, and instructions in use of assistive technology devices/adaptive equipment, direct one-on-one contact. (03004)    Home Exercise Program:  Lumbar extension progression for post derangement   [x] Reviewed/Progressed HEP activities related to strengthening, flexibility, endurance, ROM. (67359)  [] Reviewed/Progressed HEP activities related to improving balance, coordination, kinesthetic sense, posture, motor skill, proprioception.  (14790)    Manual Treatments:    [] Provided manual therapy to mobilize soft tissue/joints for the purpose of modulating pain, promoting relaxation,  increasing ROM, reducing/eliminating soft tissue swelling/inflammation/restriction, improving soft tissue extensibility.  (62075)    Service Based Modalities:     Timed Code Treatment Minutes:   27' ex     Total Treatment Minutes:   30'    Treatment/Activity Tolerance:  [x] Patient tolerated treatment well [] Patient limited by fatique  [] Patient limited by pain  [] Patient limited by other medical complications  [] Other:     Prognosis: [x] Good [] Fair  [] Poor    Patient Requires Follow-up: [x] Yes  [] No      Goals:  Short Term Goals  Time Frame for Short term goals: 1 week  Short term goal 1: start HEP - met    Long Term Goals  Time Frame for Long term goals : 4 weeks  Long term goal 1: Pain controlled at 3/10 to allow regular ADL - met  Long term goal 2: Able to continue with regular scheduled work duties -met  Long term goal 3: Standing tolerance increase to 45-60 min- met  Long term goal 4: Minimize the need for progression into invasive procedures (None planned at this time)    Plan:   [x] Continue per plan of care [] Alter current plan (see comments)  [] Plan of care initiated [] Hold pending MD visit [] Discharge    Plan for Next Session:  Sciatic N mobs    Electronically signed by:  Kala Gu PT

## 2022-09-06 ENCOUNTER — HOSPITAL ENCOUNTER (OUTPATIENT)
Dept: PHYSICAL THERAPY | Age: 62
Setting detail: THERAPIES SERIES
Discharge: HOME | End: 2022-09-06
Payer: COMMERCIAL

## 2022-09-06 PROCEDURE — 97110 THERAPEUTIC EXERCISES: CPT

## 2022-09-06 NOTE — PROGRESS NOTES
Physical Therapy    Physical Therapy Daily Treatment Note    Date:  2022    Patient Name:  Brandon Hernandez    :  1960  MRN: [id number]  Restrictions/Precautions:     Medical/Treatment Diagnosis Information:   Diagnosis: M54.16 lumbar radiculopathy  Treatment Diagnosis: M54.16 lumbar radiculopathy  Insurance/Certification information:  PT Insurance Information: BCBS  Physician Information:   [address] [place] of care signed (Y/N):  Y  Visit# / total visits:  3/10 of 2nd POC, Total 12  Pain level: 4/10       Time In: 732    Time Out: 813    Progress Note: []  Yes  [x]  No  Next due by: Visit #10, or 22      Subjective: Patient reports 4/10 LBP upon arrival. Noting that the pain is centralized to his low back and denies radiating pain. Noting that he is continuing to have bilateral LE tingling in his feet. Patient reports that he has been trying to complete his HEP more frequently but does state that he has not been very consistent with completing it. Objective: Completed EGE per flowsheet to improve core and LE stability and mobility to allow patient to complete his daily ADLs and work duties with greater ease. Discussed proper lifting technique with patient as he has moderate to heavy lifting requirements at work. Patient verbalized understanding. Observation:      Test measurements:   Lumbar extension thru full range. Good mechanical improvement from initial status  Mild stiffness at end range extension.    Sciatic N flossing does have some positive effects on peripheral symptoms, during and immediately after    Exercises:   Exercise/Equipment Resistance/Repetitions Other comments   Lay prone 4'    Prone on elbows 5'    Press up 10 x 2  Exhale OP   B PKF 10 x 2     Hip ext in prone 10 x 2    Quadruped LE, cat/camel, multifidus 10x ea, x2    Sciatic N flossing 5'    TM  RETRO        Bridge  10x Narrow, wide   Supine hip abd / 10x         Modified extension 10x x 2     Back bend 10x x 2           Hip abd, ext, HS curls, HR/TR  15x At counter   T-band Rows, ext  BLUE   SPO  Blue             [x] Provided verbal/tactile cueing for activities related to strengthening, flexibility, endurance, ROM. (82890)  [] Provided verbal/tactile cueing for activities related to improving balance, coordination, kinesthetic sense, posture, motor skill, proprioception. (98748)    Therapeutic Activities:     [] Therapeutic activities, direct (one-on-one) patient contact (use of dynamic activities to improve functional performance). (19733)    Gait:   [] Provided training and instruction to the patient for ambulation re-education. (41785)    Self-Care/ADL's  [] Self-care/home management training and compensatory training, meal preparation, safety procedures, and instructions in use of assistive technology devices/adaptive equipment, direct one-on-one contact. (76717)    Home Exercise Program:  Lumbar extension progression for post derangement   [x] Reviewed/Progressed HEP activities related to strengthening, flexibility, endurance, ROM. (73766)  [] Reviewed/Progressed HEP activities related to improving balance, coordination, kinesthetic sense, posture, motor skill, proprioception.  (36422)    Manual Treatments:    [] Provided manual therapy to mobilize soft tissue/joints for the purpose of modulating pain, promoting relaxation,  increasing ROM, reducing/eliminating soft tissue swelling/inflammation/restriction, improving soft tissue extensibility.  (32332)    Service Based Modalities:     Timed Code Treatment Minutes:   39' ex     Total Treatment Minutes:   39'    Treatment/Activity Tolerance:  [x] Patient tolerated treatment well [] Patient limited by fatique  [] Patient limited by pain  [] Patient limited by other medical complications  [] Other:     Prognosis: [x] Good [] Fair  [] Poor    Patient Requires Follow-up: [x] Yes  [] No      Goals:  Short Term Goals  Time Frame for Short term goals: 1 week  Short term goal 1: start HEP - met    Long Term Goals  Time Frame for Long term goals : 4 weeks  Long term goal 1: Pain controlled at 3/10 to allow regular ADL - partially met (4/10 on 9/6/22)  Long term goal 2: Able to continue with regular scheduled work duties -met  Long term goal 3: Standing tolerance increase to 45-60 min- met  Long term goal 4: Minimize the need for progression into invasive procedures (None planned at this time)    Plan:   [x] Continue per plan of care [] Alter current plan (see comments)  [] Plan of care initiated [] Hold pending MD visit [] Discharge    Plan for Next Session:      Electronically signed by:  Aditi Chen PT

## 2022-09-08 ENCOUNTER — HOSPITAL ENCOUNTER (OUTPATIENT)
Dept: PHYSICAL THERAPY | Age: 62
Setting detail: THERAPIES SERIES
Discharge: HOME | End: 2022-09-08
Payer: COMMERCIAL

## 2022-09-08 PROCEDURE — 97110 THERAPEUTIC EXERCISES: CPT

## 2022-09-08 NOTE — PROGRESS NOTES
Physical Therapy    Physical Therapy Daily Treatment Note    Date:  2022    Patient Name:  Karol Mendoza    :  1960  MRN: [id number]  Restrictions/Precautions:     Medical/Treatment Diagnosis Information:   Diagnosis: M54.16 lumbar radiculopathy  Treatment Diagnosis: M54.16 lumbar radiculopathy  Insurance/Certification information:  PT Insurance Information: BCBS  Physician Information:   [address] [place] of care signed (Y/N):  Y  Visit# / total visits:  4/10 of 2nd POC, Total 13  Pain level: 6/10       Time In:  727   Time Out: 811    Progress Note: []  Yes  [x]  No  Next due by: Visit #10, or 22      Subjective: Patient reports 6/10 low back pain upon arrival. Denies any radiating pain but reports that he has numbness and tingling in bilateral LE. Patient reports that he and his partner lifted about 19173#s at work last night and that is the most they have lifted there. Objective: Completed GEE per flowsheet to improve core and LE stability and mobility to allow patient to complete his daily ADLs and work duties with greater ease. Initiated additional exercises this session  Observation:        Test measurements:   Lumbar extension thru full range. Good mechanical improvement from initial status    Mild stiffness at end range extension.      Sciatic N flossing does have some positive effects on peripheral symptoms, during and immediately after    Exercises:   Exercise/Equipment Resistance/Repetitions Other comments   Lay prone 4'    Prone on elbows 5'    Press up 10 x 2  Exhale OP   B PKF 10 x 2     Hip ext in prone 10 x 2    Quadruped LE, cat/camel, multifidus 10x ea, x2         Sciatic N flossing 5'    TM  RETRO        SL hip abd 10x Initiated    Bridge  10x Narrow, wide   Supine hip abd / Add 15x Ball/band - red   LTR 10x5\" Initiated        Modified extension 10x x 2     Back bend 10x x 2      Hip abd, ext, HS curls, HR/TR  15x At counter   T-band Rows, ext  BLUE   SPO  Blue   [x] Provided 3/10 to allow regular ADL - partially met (6/10 on 9/8/22)  Long term goal 2: Able to continue with regular scheduled work duties -met  Long term goal 3: Standing tolerance increase to 45-60 min- met  Long term goal 4: Minimize the need for progression into invasive procedures (None planned at this time)    Plan:   [x] Continue per plan of care [] Alter current plan (see comments)  [] Plan of care initiated [] Hold pending MD visit [] Discharge    Plan for Next Session:      Electronically signed by:  Regina Rutherford PT

## 2022-09-13 ENCOUNTER — HOSPITAL ENCOUNTER (OUTPATIENT)
Dept: PHYSICAL THERAPY | Age: 62
Setting detail: THERAPIES SERIES
Discharge: HOME | End: 2022-09-13
Payer: COMMERCIAL

## 2022-09-13 PROCEDURE — 97110 THERAPEUTIC EXERCISES: CPT

## 2022-09-13 NOTE — PROGRESS NOTES
Physical Therapy    Physical Therapy Daily Treatment Note    Date:  2022    Patient Name:  Dorothy Garcia    :  1960  MRN: [id number]  Restrictions/Precautions:     Medical/Treatment Diagnosis Information:   Diagnosis: M54.16 lumbar radiculopathy  Treatment Diagnosis: M54.16 lumbar radiculopathy  Insurance/Certification information:  PT Insurance Information: BCBS  Physician Information:   [address] [place] of care signed (Y/N):  Y  Visit# / total visits:  5/10 of 2nd POC, Total 14  Pain level: 3/10       Time In:  734   Time Out: 819    Progress Note: []  Yes  [x]  No  Next due by: Visit #10, or 22      Subjective: Patient reported 3/10 low back pain upon arrival. Reporting that he completes his HEP to help control his pain. Noting that he still has the numbness, tingling, ad burning sensation in bilateral Lower extremities from mid calf into his feet. Noting that he may notice slight improvements with those sensations but reports that those improvements are only temporary. Reporting that his pain averages around 3/10, may be higher with increased work load. At this time, no procedures have been planned. Objective: Discussed with patient about D/C from therapy services as patient has met all goals and he is to continue with HEP at home. Patient is agreeable to plan. Reviewed the exercises on patient's HEP. Patient demonstrates independence with completing exercises during session. Provided updated written HEP of exercises that patient has been completing during the session. Educated patient to follow up with referring provider to discuss sensory impairments. No needs at this time. Observation:      Test measurements:     Lumbar extension thru full range. Good mechanical improvement from initial status    Mild stiffness at end range extension.      Sciatic N flossing does have some positive effects on peripheral symptoms, during and immediately after    Exercises:   Exercise/Equipment Resistance/Repetitions Other comments   Lay prone 4'    Prone on elbows 5'    Press up 10 x 2  Exhale OP   B PKF 10 x 2     Hip ext in prone 10 x 2    Quadruped LE, cat/camel, multifidus 10x ea, x2         Sciatic N flossing 20x ea    TM  RETRO        SL hip abd 10x    Bridge  10x Narrow, wide   Supine hip abd / Add Ball/band - red   LTR 10x5\"         Modified extension 10x x 2     Back bend 10x x 2      Hip abd, ext, 15x At counter   T-band Rows, ext  BLUE   SPO  Blue   [x] Provided verbal/tactile cueing for activities related to strengthening, flexibility, endurance, ROM. (50870)  [] Provided verbal/tactile cueing for activities related to improving balance, coordination, kinesthetic sense, posture, motor skill, proprioception. (33847)    Therapeutic Activities:     [] Therapeutic activities, direct (one-on-one) patient contact (use of dynamic activities to improve functional performance). (23007)    Gait:   [] Provided training and instruction to the patient for ambulation re-education. (20302)    Self-Care/ADL's  [] Self-care/home management training and compensatory training, meal preparation, safety procedures, and instructions in use of assistive technology devices/adaptive equipment, direct one-on-one contact. (61369)    Home Exercise Program:  Lumbar extension progression for post derangement   Access Code: [email or web address]  URL: [email or web address]  Date: 09/13/2022  Prepared by: Nato San    Exercises  Lying Prone - 3-5 x daily - 7 x weekly - 4 minutes hold  Prone Press Up On Elbows - 3-5 x daily - 7 x weekly - 5 minutes hold  Prone Press Up - 3-5 x daily - 7 x weekly - 2 sets - 10 reps  Prone Hip Extension - 3-5 x daily - 7 x weekly - 2 sets - 10 reps  Prone Knee Flexion AROM - 3-5 x daily - 7 x weekly - 2 sets - 10 reps  Cat Cow - 1 x daily - 7 x weekly - 2 sets - 10 reps  Beginner Front Arm Support - 1 x daily - 7 x weekly - 2 sets - 10 reps  Quadruped Fire Hydrant - 1 x daily - 7 x weekly - 2 sets - 10 reps  Sidelying Hip Abduction - 1 x daily - 7 x weekly - 10 reps  Supine Bridge - 1 x daily - 7 x weekly - 3 sets - 10 reps  Supine Lower Trunk Rotation - 1 x daily - 7 x weekly - 10 reps - 5 hold  Seated Slump Nerve Glide - 1 x daily - 7 x weekly - 2 sets - 20 reps  Standing Hip Abduction AROM - 1 x daily - 7 x weekly - 15 reps  Standing Hip Extension with Chair - 1 x daily - 7 x weekly - 15 reps  Standing Lumbar Extension - 3-5 x daily - 7 x weekly - 2 sets - 10 reps  Standing Lumbar Extension at Wall - Forearms - 3-5 x daily - 7 x weekly - 2 sets - 10 reps    [x] Reviewed/Progressed HEP activities related to strengthening, flexibility, endurance, ROM. (95901)  [] Reviewed/Progressed HEP activities related to improving balance, coordination, kinesthetic sense, posture, motor skill, proprioception.  (56916)    Manual Treatments:    [] Provided manual therapy to mobilize soft tissue/joints for the purpose of modulating pain, promoting relaxation,  increasing ROM, reducing/eliminating soft tissue swelling/inflammation/restriction, improving soft tissue extensibility.  (35257)    Service Based Modalities:     Timed Code Treatment Minutes:   39' ex     Total Treatment Minutes:   39'    Treatment/Activity Tolerance:  [x] Patient tolerated treatment well [] Patient limited by fatique  [] Patient limited by pain  [] Patient limited by other medical complications  [] Other:     Prognosis: [x] Good [] Fair  [] Poor    Patient Requires Follow-up: [x] Yes  [] No      Goals:  Short Term Goals  Time Frame for Short term goals: 1 week  Short term goal 1: start HEP - met    Long Term Goals  Time Frame for Long term goals : 4 weeks  Long term goal 1: Pain controlled at 3/10 to allow regular ADL - met (averages 3/10)  Long term goal 2: Able to continue with regular scheduled work duties -met  Long term goal 3: Standing tolerance increase to 45-60 min- met  Long term goal 4: Minimize the need for progression into invasive procedures  - met (None planned at this time)    Plan:   [] Continue per plan of care [] Alter current plan (see comments)  [] Plan of care initiated [] Hold pending MD visit [x] Discharge    Plan for Next Session:  D/C    Electronically signed by:  Mateo De La Garza PT

## 2022-09-13 NOTE — DISCHARGE SUMMARY
[facility]    [x] [place]  Phone: [phone]  Fax: [phone]      [] [place]  Phone: [phone]  Fax: [phone]    Physical Therapy Discharge Note  Date: 2022        Patient Name:  Toma Wolfe    :  1960  MRN: [id number]  Restrictions/Precautions:     Medical/Treatment Diagnosis Information:   Diagnosis: M54.16 lumbar radiculopathy  Treatment Diagnosis: M54.16 lumbar radiculopathy  Insurance/Certification information:  PT Insurance Information: [facility]  Physician Information:   [address] [place] of care signed (Y/N):  Y  Visit# / total visits:  5/10 of 2nd POC, Total 14  Pain level:      310           Time Period for Report: 22 - 22     Plan of Care/Treatment to date:  [x] Therapeutic Exercise    [] Modalities:  [] Therapeutic Activity     [] Ultrasound  [] Electrical Stimulation  [] Gait Training      [] Cervical Traction    [] Lumbar Traction  [] Neuromuscular Re-education  [] Cold/hotpack [] Iontophoresis  [x] Instruction in HEP      Other:  [x] Manual Therapy       []    [] Aquatic Therapy       []                              Subjective: Patient reported 3/10 low back pain upon arrival. Reporting that he completes his HEP to help control his pain. Noting that he still has the numbness, tingling, ad burning sensation in bilateral Lower extremities from mid calf into his feet. Noting that he may notice slight improvements with those sensations but reports that those improvements are only temporary. Reporting that his pain averages around 3/10, may be higher with increased work load. At this time, no procedures have been planned. Objective: Discussed with patient about D/C from therapy services as patient has met all goals and he is to continue with HEP at home. Patient is agreeable to plan. Reviewed the exercises on patient's HEP. Patient demonstrates independence with completing exercises during session.  Provided updated written HEP of exercises that patient has been completing during the session. Educated patient to follow up with referring provider to discuss sensory impairments. No needs at this time. Observation:       Test measurements:      Lumbar extension thru full range. Good mechanical improvement from initial status     Mild stiffness at end range extension. Sciatic N flossing does have some positive effects on peripheral symptoms, during and immediately after                 Assessment: Significant improvements in self reported pain levels, strenght, and functional abilities compared to evaluation.         Plan: D/C          Goals:   Short Term Goals  Time Frame for Short term goals: 1 week  Short term goal 1: start HEP - met     Long Term Goals  Time Frame for Long term goals : 4 weeks  Long term goal 1: Pain controlled at 3/10 to allow regular ADL - met (averages 3/10)  Long term goal 2: Able to continue with regular scheduled work duties -met  Long term goal 3: Standing tolerance increase to 45-60 min- met  Long term goal 4: Minimize the need for progression into invasive procedures  - met (None planned at this time)         Percentage of Goals Met: 100%            Discharge Prognosis: [] Excellent [x] Good [] Fair  [] Poor     Goal Status:  [x] Achieved [] Partially Achieved  [] Not Achieved       Electronically signed by:  Radha Moran PT

## 2022-09-15 ENCOUNTER — APPOINTMENT (OUTPATIENT)
Dept: PHYSICAL THERAPY | Age: 62
End: 2022-09-15
Payer: COMMERCIAL

## 2022-09-30 RX ORDER — MELOXICAM 15 MG/1
15 TABLET ORAL DAILY
Qty: 30 TABLET | Refills: 1 | Status: SHIPPED | OUTPATIENT
Start: 2022-09-30 | End: 2022-10-21 | Stop reason: SDUPTHER

## 2022-09-30 NOTE — TELEPHONE ENCOUNTER
Last Appt:  7/22/2022  Next Appt:   10/21/2022  Med verified in [facility]

## 2022-10-13 RX ORDER — OXCARBAZEPINE 150 MG/1
TABLET, FILM COATED ORAL
Qty: 120 TABLET | Refills: 2 | Status: SHIPPED | OUTPATIENT
Start: 2022-10-13 | End: 2022-10-21 | Stop reason: SDUPTHER

## 2022-10-13 NOTE — TELEPHONE ENCOUNTER
Last Appt:  7/22/2022  Next Appt:   10/21/2022  Med verified in Epic     Patient needs refill on Trileptal

## 2022-10-21 ENCOUNTER — OFFICE VISIT (OUTPATIENT)
Dept: NEUROLOGY | Age: 62
End: 2022-10-21
Payer: COMMERCIAL

## 2022-10-21 VITALS
DIASTOLIC BLOOD PRESSURE: 74 MMHG | BODY MASS INDEX: 31.34 KG/M2 | HEIGHT: 72 IN | SYSTOLIC BLOOD PRESSURE: 126 MMHG | OXYGEN SATURATION: 100 % | HEART RATE: 76 BPM | WEIGHT: 231.4 LBS

## 2022-10-21 DIAGNOSIS — R20.0 NUMBNESS AND TINGLING OF BOTH LEGS BELOW KNEES: ICD-10-CM

## 2022-10-21 DIAGNOSIS — M54.16 CHRONIC LUMBAR RADICULOPATHY: Primary | ICD-10-CM

## 2022-10-21 DIAGNOSIS — R20.2 NUMBNESS AND TINGLING OF BOTH LEGS BELOW KNEES: ICD-10-CM

## 2022-10-21 DIAGNOSIS — M79.2 NEUROPATHIC PAIN: ICD-10-CM

## 2022-10-21 DIAGNOSIS — F41.8 DEPRESSION WITH ANXIETY: ICD-10-CM

## 2022-10-21 DIAGNOSIS — R27.8 SENSORY ATAXIA: ICD-10-CM

## 2022-10-21 DIAGNOSIS — Z91.81 H/O FALL: ICD-10-CM

## 2022-10-21 DIAGNOSIS — F17.200 SMOKER: ICD-10-CM

## 2022-10-21 PROCEDURE — 99214 OFFICE O/P EST MOD 30 MIN: CPT | Performed by: PSYCHIATRY & NEUROLOGY

## 2022-10-21 RX ORDER — OXCARBAZEPINE 150 MG/1
TABLET, FILM COATED ORAL
Qty: 120 TABLET | Refills: 2 | Status: SHIPPED | OUTPATIENT
Start: 2022-10-21

## 2022-10-21 RX ORDER — MELOXICAM 15 MG/1
15 TABLET ORAL DAILY
Qty: 30 TABLET | Refills: 1 | Status: SHIPPED | OUTPATIENT
Start: 2022-10-21

## 2022-10-21 ASSESSMENT — ENCOUNTER SYMPTOMS
WHEEZING: 0
SORE THROAT: 0
APNEA: 0
CHOKING: 0
VOICE CHANGE: 0
SHORTNESS OF BREATH: 0
TROUBLE SWALLOWING: 0
COUGH: 0
SINUS PRESSURE: 0
CHEST TIGHTNESS: 0
FACIAL SWELLING: 0

## 2022-10-21 NOTE — PROGRESS NOTES
[facility]    [address]:[phone]   Fax: [phone]        SUBJECTIVE:       PATIENT ID:  Rudy Galindo is a  RIGHT    HANDED 63 y.o. male. Neurologic Problem  The patient's primary symptoms include clumsiness and focal sensory loss. Primary symptoms comment: PERIPHERAL  POLYNEUROPATHY  AND  CHRONIC   LUMBAR  PAIN    . This is a chronic problem. The neurological problem developed insidiously. Pertinent negatives include no shortness of breath. Past treatments include bed rest and sleep. The treatment provided no relief. There is no history of a bleeding disorder, a clotting disorder, a CVA, dementia, head trauma, liver disease or seizures. History obtained from  The   PATIENT         and other  available   medical  records   were  Also  reviewed. The  Duration,  Quality,  Severity,  Location,  Timing,  Context,  Modifying  Factors   Of   The   Chief   Complaint       And  Present  Illness  Was   Reviewed   In   Chronological   Manner. PATIENT'S  MAIN  CONCERNS INCLUDE :                     1)     H/O   CHRONIC  PERIPHERAL  POLYNEUROPATHY                                     BOTH LOWER  EXTREMITIES    FOR    5   YEARS                        2)      NO    H/O    DM. 3)      PREVIOUS      H/O      ALCOHOL    ABUSE                                        QUIT  ALCOHOL   FOR   3   YEARS                      4)    H/O     CHRONIC  SMOKING                    PATIENT  AWARE  OF  RISKS  AND                 SIDE  EFFECTS  OF   SMOKING   DISCUSSED. PATIENT   ADVISED   AND  COUNSELED    TO   QUIT  SMOKING.                      5)      H/O   CHRONIC  ANXIETY,  DEPRESSION                                              FOR    6    YEARS                            -  ON LAMICTAL,  ZOLOFT ,   EFFEXOR                      6)     CO   MORBID  MEDICAL CONDITIONS                            BEING  FOLLOWED  BY   HIS PCP                       7)       PATIENT    FACTORY     WORKER       FOR    30   YEARS                               INVOLVING  PROLONGED   STANDING                      8)      H/O   CHRONIC  LUMBAR  PAIN    FOR    10    YEARS                        9)     EMG /  NC  STUDIES  IN   FEB. 2022     SHOWED                             A)   MODERATE  PERIPHERAL  POLYNEUROPATHY                          B)    CHRONIC  LOWER  LUMBAR  RADICULOPATHY                                              10)     PREVIOUS      H/O    FALL                                       -   UN EXPLAINED                        11)          SENSORY   ATAXIA                            12)      LABS   WORK  UP   FOR  NEUROPATHY     DID  NOT                                SHOW     ANY  SIGNIFICANT  ABNORMALITIES                                 CHEST  X RAY   SHOWED    NO    ACUTE PATHOLOGY                                  PATIENT STARTED    ON     TRILEPTAL   IN  MAY  2022                               DOSE    WILL  BE  UP  TITRATED      FOR  SYMPTOMATIC  RELIEF                                                          13)        PATIENT     HAD   MRI  LUMBAR  SPINE    FOR                                     CHRONIC    LUMBAR  RADICULAR  PAIN    IN  June 2022                                 SHOWED      MULTI  LEVEL   DEGENERATIVE  DISC  DISEASE                                      -   RESULTS    REVIEWED.                             14)      PATIENT    INDICATED     HIS   LUMBAR  PAIN      AND      NEUROPATHIC                                    SYMPTOM        ARE     BETTER        MEDICATIONS    AND   THERAPY                                           NO    NEW      NEUROLOGICAL       CONCERNS                       15)           RECOMMENDED:                                         A)    FALL  PRECAUTIONS                                        B)    AVOID PROLONGED   STANDING C)   AVOID  BACK  STRAINING  ACTIVITIES                                        D)     TO  CONTINUE    MOBIC    15   MG  PO  DAILY                                                     AND  TRILEPTAL   FOR    SYMPTOMATIC  RELIEF                                        E)   PHYSICAL  THERAPY                                         F)    PAIN  MANAGEMENT      DEFERRED                                            G)     SURGICAL  MANAGEMENT     NOT  INDICATED                                                        *    EXPECTATIONS,   GOALS  OF    THERAPY ,    PROGNOSIS                                      AND  SIDE  EFFECTS  MEDICATIONS    WERE                                 REVIEWED     AND   DISCUSSED    IN    DETAIL. 16)      VARIOUS  RISK   FACTORS   WERE  REVIEWED   AND   DISCUSSED. PATIENT   HAS  MULTIPLE   MEDICAL, NEUROLOGICAL                        AND   MENTAL HEALTH   PROBLEMS .                                                PRECIPITATING  FACTORS: including  fever/infection, exertion/relaxation, position change, stress,                weather change,   medications/alcohol, time of day/darkness/light  Are  present                                                          MODIFYING  FACTORS:  fever/infection, exertion/relaxation, position change, stress, weather change,               medications/alcohol, time of day/darkness/light  Are  present                Patient   Indicates   The  Presence   And  The  Absence  Of  The  Following    Associated  And             Additional  Neurological    Symptoms:                                Balance  And coordination   problems  present           Gait problems     absent            Headaches      absent              Migraines           absent           Memory problemsabsent              Confusion        absent            Paresthesia   numbness          absent           Seizures  And Starring  Episodes           absent           Syncope,  Near  syncopal episodes         absent           Speech   problems           absent             Swallowing   Problems      absent            Dizziness,  Light headedness           absent              Vertigo        absent             Generalized   Weakness    absent              focal  Weakness     absent             Tremors         absent              Sleep  Problems     absent             History  Of   Recent  Head  Injury     absent             History  Of   Recent  TIA     absent             History  Of   Recent    Stroke     absent             Neck  Pain   and   Neck muscle  Spasms  absent               Radiating  down   And   Weakness           absent            Lower back   Pain  And     Spasms  present              Radiating    Down   And   Weakness          present                H/OFALLS        present               History  Of   Visual  Symptoms    absent                  Associated   Diplopia       absent                                               Also   Additional   Symptoms   Present    As  Documented    In   The   detailed                  Review  Of  Systems   And    Please   Refer   To    Them for   Additional    Information. Any components  That are either  Unobtainable  Or  Limited  In   HPI, ROS  And/or PFSH   Are                   Due   ToPatient's  Medical  Problems,  Clinical  Condition   and/or lack of                                 other    Alternate   resources. RECORDS   REVIEWED:    historical medical records           INFORMATION   REVIEWED:     MEDICAL   HISTORY,SURGICAL   HISTORY,     MEDICATIONS   LIST,   ALLERGIES AND  DRUG  INTOLERANCES,       FAMILY   HISTORY,  SOCIAL  HISTORY,      PROBLEM  LIST   FOR  PATIENT  CARE   COORDINATION          Past Medical History:   Diagnosis Date    Adult situational stress disorder     Anger     Issues.     Depression with anxiety     History of tobacco abuse Hyperlipidemia     Hypertension     YESICA (obstructive sleep apnea)     cpap noncompliace    Osteoarthritis     back and knees    Suicide attempt [facility]) Age 26         Past Surgical History:   Procedure Laterality Date    COLONOSCOPY  03/21/2013    With EGD with biopsies and polypectomies showing severe esophagitis with ulceration, gastritis with antral erosions, hiatal hernia and small rectal polyp. EYE SURGERY Left     INGUINAL HERNIA REPAIR  06/16/2021    lap left ing [id number]    KNEE ARTHROSCOPY Left     Meniscus repair, [facility], Dr. David Norwood. MEATOTOMY      For meatal stenosis. UVULECTOMY      WISDOM TOOTH EXTRACTION           Current Outpatient Medications   Medication Sig Dispense Refill    OXcarbazepine (TRILEPTAL) 150 MG tablet TWO    TABLETS  TWICE  DAILY 120 tablet 2    meloxicam (MOBIC) 15 MG tablet Take 1 tablet by mouth daily 30 tablet 1    aspirin 81 MG EC tablet Take 81 mg by mouth      glucosamine-chondroitin 500-400 MG tablet Take 2 tablets by mouth      tamsulosin (FLOMAX) 0.4 MG capsule Take 0.4 mg by mouth every evening      hydrochlorothiazide (HYDRODIURIL) 25 MG tablet TAKE ONE TABLET BY MOUTH ONCE DAILY 90 tablet 0    simvastatin (ZOCOR) 40 MG tablet Take 1 tablet by mouth daily. 90 tablet 1    lamoTRIgine (LAMICTAL) 25 MG tablet Take 1 tablet by mouth 2 times daily. 180 tablet 1    omeprazole (PRILOSEC) 40 MG capsule Take 1 capsule by mouth daily. 90 capsule 1    sertraline (ZOLOFT) 100 MG tablet Take 1.5 tablets by mouth daily. 135 tablet 1    venlafaxine (EFFEXOR-XR) 75 MG XR capsule Take 1 capsule by mouth daily. 90 capsule 1     No current facility-administered medications for this visit. No Known Allergies      Family History   Problem Relation Age of Onset    Other Father         Paralyzed. Heart Attack Father         Myocardial infarction.     Coronary Art Dis Father     Stroke Maternal Grandmother     Diabetes Maternal Grandmother Cancer Sister         Unknown type.     Glaucoma Mother     Sudden Death Sister         shot by  during divorce         Social History     Socioeconomic History    Marital status:      Spouse name: Not on file    Number of children: Not on file    Years of education: Not on file    Highest education level: Not on file   Occupational History    Not on file   Tobacco Use    Smoking status: Every Day     Packs/day: 1.00     Years: 40.00     Pack years: 40.00     Types: Cigarettes     Start date: 1/1/1982    Smokeless tobacco: Never   Substance and Sexual Activity    Alcohol use: No    Drug use: No    Sexual activity: Not on file   Other Topics Concern    Not on file   Social History Narrative    Not on file     Social Determinants of Health     Financial Resource Strain: Not on file   Food Insecurity: Not on file   Transportation Needs: Not on file   Physical Activity: Not on file   Stress: Not on file   Social Connections: Not on file   Intimate Partner Violence: Not on file   Housing Stability: Not on file       Vitals:    10/21/22 0906   BP: 126/74   Pulse: 76   SpO2: 100%         Wt Readings from Last 3 Encounters:   10/21/22 231 lb 6.4 oz (105 kg)   07/22/22 238 lb (108 kg)   06/28/22 238 lb (108 kg)         BP Readings from Last 3 Encounters:   10/21/22 126/74   07/22/22 124/68   06/20/22 122/70           Hematology and Coagulation    Lab Results   Component Value Date/Time    WBC 8.8 05/09/2022 10:38 AM    RBC 5.24 05/09/2022 10:38 AM    HGB 16.1 05/09/2022 10:38 AM    HCT 48.2 05/09/2022 10:38 AM    MCV 92.0 05/09/2022 10:38 AM    MCH 30.7 05/09/2022 10:38 AM    MCHC 33.4 05/09/2022 10:38 AM    RDW 14.4 05/09/2022 10:38 AM     05/09/2022 10:38 AM    MPV 10.3 05/09/2022 10:38 AM       Chemistries    Lab Results   Component Value Date/Time     05/09/2022 10:38 AM    K 3.8 05/09/2022 10:38 AM     05/09/2022 10:38 AM    CO2 27 05/09/2022 10:38 AM    BUN 16 05/09/2022 10:38 AM CREATININE 0.81 05/09/2022 10:38 AM    CALCIUM 9.5 05/09/2022 10:38 AM    PROT 7.2 05/09/2022 10:38 AM    PROT 7.6 05/09/2022 10:38 AM    LABALBU 4.8 05/09/2022 10:38 AM    BILITOT 0.51 05/09/2022 10:38 AM    ALKPHOS 55 05/09/2022 10:38 AM    AST 25 05/09/2022 10:38 AM    ALT 16 05/09/2022 10:38 AM     Lab Results   Component Value Date/Time    ALKPHOS 55 05/09/2022 10:38 AM    ALT 16 05/09/2022 10:38 AM    AST 25 05/09/2022 10:38 AM    PROT 7.2 05/09/2022 10:38 AM    PROT 7.6 05/09/2022 10:38 AM    BILITOT 0.51 05/09/2022 10:38 AM    LABALBU 4.8 05/09/2022 10:38 AM     Lab Results   Component Value Date/Time    BUN 16 05/09/2022 10:38 AM    CREATININE 0.81 05/09/2022 10:38 AM     Lab Results   Component Value Date/Time    CALCIUM 9.5 05/09/2022 10:38 AM     Lab Results   Component Value Date/Time    AST 25 05/09/2022 10:38 AM    ALT 16 05/09/2022 10:38 AM           Review of Systems   HENT:  Negative for congestion, dental problem, drooling, ear discharge, ear pain, facial swelling, hearing loss, mouth sores, nosebleeds, postnasal drip, sinus pressure, sore throat, tinnitus, trouble swallowing and voice change. Respiratory:  Negative for apnea, cough, choking, chest tightness, shortness of breath and wheezing. OBJECTIVE:      Physical Exam  Constitutional:       Appearance: He is well-developed. HENT:      Head: Normocephalic and atraumatic. No raccoon eyes or Reynoso's sign. Right Ear: External ear normal.      Left Ear: External ear normal.      Nose: Nose normal.   Eyes:      Conjunctiva/sclera: Conjunctivae normal.      Pupils: Pupils are equal, round, and reactive to light. Neck:      Thyroid: No thyroid mass or thyromegaly. Vascular: No carotid bruit. Trachea: No tracheal deviation. Meningeal: Brudzinski's sign and Kernig's sign absent. Cardiovascular:      Rate and Rhythm: Regular rhythm.    Pulmonary:      Effort: Pulmonary effort is normal.   Musculoskeletal: General: No tenderness. Cervical back: Normal range of motion and neck supple. No rigidity. No muscular tenderness. Normal range of motion. Skin:     General: Skin is warm. Coloration: Skin is not pale. Findings: No erythema or rash. Nails: There is no clubbing. Psychiatric:         Attention and Perception: He is attentive. Mood and Affect: Mood is anxious. Mood is not depressed. Affect is not labile, blunt or inappropriate. Speech: He is communicative. Speech is not rapid and pressured, delayed, slurred or tangential.         Behavior: Behavior is not agitated, slowed, aggressive, withdrawn, hyperactive or combative. Behavior is cooperative. Thought Content: Thought content is not paranoid or delusional. Thought content does not include homicidal or suicidal ideation. Thought content does not include homicidal or suicidal plan. Cognition and Memory: Memory is not impaired. He does not exhibit impaired recent memory or impaired remote memory. Judgment: Judgment is not impulsive or inappropriate. NEUROLOGICALEXAMINATION :       A) MENTAL STATUS:                   Alert and  oriented  To time, place  And  Person. No Aphasia. No  Dysarthria. Able   To  Follow     SIMPLE    commands   without   Any  Difficulty. No right  To left confusion. Normal  Speech  And language function. Insight and  Judgment ,Fund  Of  Knowledge   within normal limits                Recent  And  Remote memory  within   normal limits                Attention &  Concentration are within   normal limits                                                   B) CRANIAL NERVES :        CN : Visual  Acuity  And  Visual fields  within normal limits               Fundi  Could  Not  Be  Could  Not  Be  Evaluated. 3,4,6 CN : Both  Pupils are   Reactive and  Equal.  Movements  Are  Intact. No  Nystagmus. No  ANTWAN. No  Afferent  Pupillary  Defect noted. 5 CN :  Normal  Facial sensations and Corneal  Reflexes           7 CN:  Normal  Facial  Symmetry  And  Strength. No facial  Weakness. 8 CN :  Hearing  Appears within normal limits          9, 10 CN: Normal   spontaneous, reflex   palate   movements         11 CN:   Normal  Shoulder  shrug and  strength         12 CN :   Normal  Tongue movements and  Tongue  In midline                        No tongue   Fasciculations or atrophy       C) MOTOR  EXAM:                 Strength  In upper  And  Lower   extremities   within   normal limits               No  Drift. No  Atrophy               Rapid   alternating  And  repetitions  Movements  within   normal limits                 Muscle  Tone  In upper  And  Lower  Extremities  normal                No rigidity. No  Spasticity. Bradykinesia   absent                 No  Asterixis. Sustention  Tremor , Resting   Tremor   absent                    No   other  Abnormal  Movements noted           D) SENSORY :               Light   touch, pinprick,   position  And  Vibration     DECREASED                          BELOW  KNEE  LEVELS   BILATERALLY        E) REFLEXES:                   Deep  Tendon  Reflexes   DECREASED                   No  pathological  Reflexes  Bilaterally.                                   F) COORDINATION  AND  GAIT :                                Station and  Gait  normal                              Romberg 's test   POSITIVE                            Ataxia negative      ASSESSMENT:      Patient Active Problem List   Diagnosis    Hypertension    Hyperlipidemia    Depression with anxiety    YESICA (obstructive sleep apnea)    Osteoarthritis    Smoker    Idiopathic peripheral neuropathy    Numbness and tingling of both legs below knees    Neuropathic pain    Chronic obstructive pulmonary disease (HCC)    Sensory ataxia    H/O fall    Chronic lumbar radiculopathy    Lumbar degenerative disc disease       CT OF THE LUMBAR SPINE WITHOUT CONTRAST  7/11/2022       TECHNIQUE:   CT of the lumbar spine was performed without the administration of   intravenous contrast. Multiplanar reformatted images are provided for review. Adjustment of mA and/or kV according to patient size was utilized. Automated   exposure control, iterative reconstruction, and/or weight based adjustment of   the mA/kV was utilized to reduce the radiation dose to as low as reasonably   achievable. COMPARISON:   MRI of the lumbar spine February 28, 2022. HISTORY:   ORDERING SYSTEM PROVIDED HISTORY: Numbness and tingling of both legs below   knees   TECHNOLOGIST PROVIDED HISTORY:   Reason for Exam: Numbness and tingling of both legs       FINDINGS:   Retroperitoneum/soft tissues: Mass projecting from the mid aspect of the   right kidney measuring proximally 2 cm diameter and noted on previous MRI to   represent a cyst.       Calcifications present within the wall of the aorta and branching vessels. Vacuum joint phenomena in portions of the right and left SI joints. 5 lumbar type vertebral bodies. Straightening of the normal lumbar   curvature. Approximately 2 mm posterior listhesis L3 on L4 and L4 on L5 and   L5 on S1 with vacuum joint phenomenon L3-4, L4-5, and L5-S1. Spondylitic   endplate degenerative changes throughout, greater anteriorly at L1-2. Narrowing of all of the disc spaces but somewhat greater at L4-5 and L5-S1. T12-L1: Some degenerative changes in the endplates and facets. No   significant narrowing of the central canal or the neural foramina. L1-2: Degenerative changes in the endplates and facets. Mild facet   hypertrophy. No significant narrowing of the central canal or the neural   foramina. Mild narrowing of the disc space itself. L2-3: Disc dehydration.   Degenerative changes in the endplates and facets. Mild facet hypertrophy. No significant narrowing of the central canal.   Narrowing of the inferior recesses. L3-4: Disc dehydration. Degenerative changes in the endplates and facets. Facet hypertrophy. Narrowing of the right neural foramen and the inferior   recesses. Bulging disc containing calcification posteriorly as well as   spondylitic changes. No significant narrowing of the central canal.       L4-5: Disc dehydration. Narrowing of the disc space. Degenerative changes   in the endplates and facets. Facet hypertrophy. Right lateral spondylitic   changes and calcification in a right lateral disc causes marked narrowing of   the right neural foramen. Mild narrowing of the left neural foramen. L5-S1: Disc dehydration. Narrowing of the disc space. Degenerative changes   in the endplates and facets with a degree of facet hypertrophy. Spondylitic   spurring is greater toward the right and causes marked narrowing of the right   neural foramen. Mild narrowing of the left neural foramen. No significant   narrowing of central canal.           Impression   Degenerative changes in the lumbar spine as described in detail above. VISITING DIAGNOSIS:      ICD-10-CM    1. Chronic lumbar radiculopathy  M54.16       2. Neuropathic pain  M79.2       3. Smoker  F17.200       4. Sensory ataxia  R27.8       5. Depression with anxiety  F41.8       6. H/O fall  Z91.81       7. Numbness and tingling of both legs below knees  R20.0     R20.2                    CONCERNS   &   INCREASED   RISK   FOR         *  MONONEUROPATHIES,   RADICULOPATHY        *    BALANCE PROBLMES   AND  FALL                VARIOUS  RISK   FACTORS   WERE  REVIEWED   AND   DISCUSSED. *  PATIENT   HAS  MULTIPLE   MEDICAL, NEUROLOGICAL                        AND   MENTAL HEALTH   PROBLEMS . PATIENT'S   MANAGEMENT  IS  CHALLENGING.             PLAN:                         Simone Pretty  Of  The Diagnoses,  The  Management & Treatment  Options            AND    Care  plan  Were          Reviewed and   Discussed   With  patient. * Goals  And  Expectations  Of  The  Therapy  Discussed   And  Reviewed. *   Benefits   And   Side  Effect  Profile  Of  Medication/s   Were   Discussed                * Need   For  Further   Follow up For  The  Various  Problems Were  discussed. * Results  Of  The  Previous  Diagnostic tests were reviewed and  discussed                   Medical  Decision  Making  Was  Made  Based on the   Complexity  Of  Above  Mentioned  Diagnoses,    Data reviewed             And    Risk  Of  Significant   Co morbidities and   complicating   Factors. Medical  Decision  Was     High     Complexity   Due   To  The  Patient's  Multiple  Symptoms  &  Disease,            Complex  Treatment  Regimen,  Multiple medications           and   Risk  Of   Side  Effects,  Difficulty  In  Medication  Management  And  Diagnostic  Challenges           In  View  Of  The  Associated   Co  Morbid  Conditions   And  Problems. * FALL   PRECAUTIONS. THESE  REVIEWED   AND  DISCUSSED      *    AVOID PROLONGED     STANDING          *     MAY   REST        AT    WORK     AS   NEEDED          *   BE  CAREFUL  WITH  ACTIVITIES          *   AVOID  BACK  STRAINING  ACTIVITIES              *   ADEQUATE   FLUID  INTAKE   AND  ELECTROLYTE  BALANCE           * KEEP  DAIRY  OF   THE  NEUROLOGICAL  SYMPTOMS        RECORDING THE    DURATION  AND  FREQUENCY. *TO  MAINTAIN  REGULAR  SLEEP  WAKE  CYCLES.      *   TO  HAVE  ADEQUATE  REST  AND   SLEEP    HOURS.              *    AVOID  ANY USAGE OF    TOBACCO,          AVOID  EXCESSIVE  ALCOHOL  AND   ILLEGAL   SUBSTANCES          *  CONTINUE   MEDICATIONS    PRESCRIBED       AS    RECOMMENDED       *   Compliance   With  Medications   And  Instructions          *    Antiplatelet  therapy    As Recommended  Was   Discussed      *    Prophylactic  Use   Of     Vitamin   B   Complex,  Folic  Acid,    Vitamin  B12    Multivitamin,       Calcium  With  magnesium  And  Vit D    Supplementations   Over  The  Counter  Discussed             *FOOT  CARE, DAILY  INSPECTION  OF  FEET   AND         PERIODIC  PODIATRY EVALUATIONS . *     RECOMMENDED:                                         A)    FALL  PRECAUTIONS                                        B)    AVOID PROLONGED   STANDING                                        C)   AVOID  BACK  STRAINING  ACTIVITIES                                        D)     TO  CONTINUE    MOBIC    15   MG  PO  DAILY                                                     AND  TRILEPTAL   FOR    SYMPTOMATIC  RELIEF                                        E)   PHYSICAL  THERAPY                                         F)    PAIN  MANAGEMENT      DEFERRED                                            G)     SURGICAL  MANAGEMENT     NOT  INDICATED                                  -    REVIEWED    AND  DISCUSSED      WITH PATIENT  IN  DETAIL                     Orders Placed This Encounter   Medications    OXcarbazepine (TRILEPTAL) 150 MG tablet     Sig: TWO    TABLETS  TWICE  DAILY     Dispense:  120 tablet     Refill:  2    meloxicam (MOBIC) 15 MG tablet     Sig: Take 1 tablet by mouth daily     Dispense:  30 tablet     Refill:  1             *PATIENT   TO  FOLLOW  UP  WITH   PRIMARY  CARE         OTHER  CONSULTANTS  AS  BEFORE.               *TO  FOLLOW  WITH   MENTAL  HEALTH  PROFESSIONALS ,  INCLUDING            PSYCHOLOGICAL  COUNSELING   AND  PSYCHIATRIC  EVALUTIONS,                     *  Maintain   Healthy  Life Style    With   Periodic  Monitoring  Of          Any  Medical  Conditions  Including   Elevated  Blood  Pressure,  Lipid  Profile,        Blood  Sugar levels  AndHeart  Disease.                 *   Period   Screening  For  Cancers  Involving  Breast,  Colon, feel good, stay at ahealthy weight, and have plenty of energy for both work and play. A healthy lifestyle is something you can share with your whole family. A healthy lifestyle also can lower your risk for serious health problems, such ashigh blood pressure, heart disease, and diabetes. You can follow a few steps listed below to improve your health and the health of your family. Follow-up careis a key part of your treatment and safety. Be sure to make and go to all appointments, and call your doctor if you are having problems. Its also a good idea to know your test results and keep a list of the medicines you take. How can you care for yourself at home? Do not eat too much sugar, fat, or fast foods. You can still have dessert and treats nowand then. The goal is moderation. Start small to improve your eating habits. Pay attention to portion sizes, drink less juice and soda pop, and eat more fruits and vegetables. Eat a healthy amount of food. A 3-ounce serving of meat, for example, is about the size of a deck of cards. Fill the rest of your plate with vegetables and whole grains. Limit theamount of soda and sports drinks you have every day. Drink more water when you are thirsty. Eat at least 5 servings of fruits and vegetables every day. It may seem like a lot, but it is not hard to reach this goal. Aserving or helping is 1 piece of fruit, 1 cup of vegetables, or 2 cups of leafy, raw vegetables. Have an apple or some carrot sticks as an afternoon snack instead of a candy bar. Try to have fruits and/or vegetables at everymeal.  Make exercise part of your daily routine. You may want to start with simple activities, such as walking, bicycling, or slow swimming. Try carmen active 30 to 60 minutes every day. You do not need to do all 30 to 60 minutes all at once. For example, you can exercise 3 times a day for 10 or 20 minutes.  Moderate exercise is safe for most people, but it is always agood idea to talk to your doctor before starting an exercise program.  Keep moving. Alicia Alvarado the lawn, work in the garden, or [employer]. Take the stairs instead of the elevator at work. If you smoke, quit. Peoplewho smoke have an increased risk for heart attack, stroke, cancer, and other lung illnesses. Quitting is hard, but there are ways to boost your chance of quitting tobacco for good. Use nicotine gum, patches, or lozenges. Ask your doctor about stop-smoking programs and medicines. Keep trying. In addition to reducing your risk of diseases in the future, you will notice some benefits soon after you stop using tobacco. If you have shortness of breath or asthma symptoms, they will likely getbetter within a few weeks after you quit. Limit how much alcohol you drink. Moderate amounts of alcohol (up to 2 drinks a day for men, 1drink a day for women) are okay. But drinking too much can lead to liver problems, high blood pressure, and other health problems. health  If you have a family, there are many things you can do together to improve your health. Eat meals together as a family as often as possible. Eat healthy foods. This includes fruits, vegetables, lean meats and dairy, and whole grains. Include your family in your fitness plan. Most peoplethink of activities such as jogging or tennis as the way to fitness, but there are many ways you and your family can be more active. Anything that makes you breathe hard and gets your heart pumping is exercise. Here are sometips:  Walk to do errands or to take your child to school or the bus. Go for a family bike ride after dinner instead of watching TV. Where can you learn more? Go [email or web address]://[email or web address].[email or web address] org and sign in to your [employer] account. Enter Y053 in the Search HealthInformation box to learn more about \"A Healthy Lifestyle: Care Instructions. \"     If you do not have anaccount, please click on the \"Sign Up Now\" link.   Current as of: July 26, 2016  Content Version: 11.2  © 3774-8179 [employer], [employer]. Care instructions adapted under license by [facility] ([facility]). If you have questions about a medical condition or this instruction, always ask your healthcare professional. [employer] disclaims any warranty or liability for your use of this information.

## 2023-01-19 RX ORDER — OXCARBAZEPINE 150 MG/1
TABLET, FILM COATED ORAL
Qty: 120 TABLET | Refills: 2 | Status: SHIPPED | OUTPATIENT
Start: 2023-01-19

## 2023-01-19 RX ORDER — MELOXICAM 15 MG/1
15 TABLET ORAL DAILY
Qty: 30 TABLET | Refills: 1 | Status: SHIPPED | OUTPATIENT
Start: 2023-01-19

## 2023-01-19 NOTE — TELEPHONE ENCOUNTER
Last Appt:  10/21/2022  Next Appt:   2/24/2023  Med verified in Epic     Patient needs refill on Mobic and Trileptal

## 2023-02-24 ENCOUNTER — OFFICE VISIT (OUTPATIENT)
Dept: NEUROLOGY | Age: 63
End: 2023-02-24
Payer: COMMERCIAL

## 2023-02-24 VITALS
BODY MASS INDEX: 31.15 KG/M2 | HEIGHT: 72 IN | WEIGHT: 230 LBS | DIASTOLIC BLOOD PRESSURE: 74 MMHG | SYSTOLIC BLOOD PRESSURE: 122 MMHG

## 2023-02-24 DIAGNOSIS — R20.2 NUMBNESS AND TINGLING OF BOTH LEGS BELOW KNEES: ICD-10-CM

## 2023-02-24 DIAGNOSIS — G60.9 IDIOPATHIC PERIPHERAL NEUROPATHY: Primary | ICD-10-CM

## 2023-02-24 DIAGNOSIS — M79.2 NEUROPATHIC PAIN: ICD-10-CM

## 2023-02-24 DIAGNOSIS — F41.8 DEPRESSION WITH ANXIETY: ICD-10-CM

## 2023-02-24 DIAGNOSIS — I10 PRIMARY HYPERTENSION: ICD-10-CM

## 2023-02-24 DIAGNOSIS — F17.200 SMOKER: ICD-10-CM

## 2023-02-24 DIAGNOSIS — M51.36 LUMBAR DEGENERATIVE DISC DISEASE: ICD-10-CM

## 2023-02-24 DIAGNOSIS — R27.8 SENSORY ATAXIA: ICD-10-CM

## 2023-02-24 DIAGNOSIS — G62.1 ALCOHOLIC PERIPHERAL NEUROPATHY (HCC): ICD-10-CM

## 2023-02-24 DIAGNOSIS — Z91.81 H/O FALL: ICD-10-CM

## 2023-02-24 DIAGNOSIS — R20.0 NUMBNESS AND TINGLING OF BOTH LEGS BELOW KNEES: ICD-10-CM

## 2023-02-24 DIAGNOSIS — M54.16 CHRONIC LUMBAR RADICULOPATHY: ICD-10-CM

## 2023-02-24 PROCEDURE — 3078F DIAST BP <80 MM HG: CPT | Performed by: PSYCHIATRY & NEUROLOGY

## 2023-02-24 PROCEDURE — 3074F SYST BP LT 130 MM HG: CPT | Performed by: PSYCHIATRY & NEUROLOGY

## 2023-02-24 PROCEDURE — 99214 OFFICE O/P EST MOD 30 MIN: CPT | Performed by: PSYCHIATRY & NEUROLOGY

## 2023-02-24 RX ORDER — OXCARBAZEPINE 150 MG/1
TABLET, FILM COATED ORAL
Qty: 120 TABLET | Refills: 2 | Status: SHIPPED | OUTPATIENT
Start: 2023-02-24

## 2023-02-24 RX ORDER — GABAPENTIN 300 MG/1
CAPSULE ORAL
Qty: 90 CAPSULE | Refills: 1 | Status: SHIPPED | OUTPATIENT
Start: 2023-02-24 | End: 2023-03-24

## 2023-02-24 RX ORDER — MELOXICAM 15 MG/1
15 TABLET ORAL DAILY
Qty: 30 TABLET | Refills: 1 | Status: SHIPPED | OUTPATIENT
Start: 2023-02-24

## 2023-02-24 ASSESSMENT — ENCOUNTER SYMPTOMS
APNEA: 0
TROUBLE SWALLOWING: 0
SHORTNESS OF BREATH: 0
COUGH: 0
SORE THROAT: 0
SINUS PRESSURE: 0
WHEEZING: 0
CHEST TIGHTNESS: 0
CHOKING: 0
VOICE CHANGE: 0
FACIAL SWELLING: 0
BACK PAIN: 1

## 2023-02-24 NOTE — PATIENT INSTRUCTIONS
* FALL   PRECAUTIONS.              *   ADEQUATE   FLUID  INTAKE   AND  ELECTROLYTE  BALANCE             * KEEP  DAIRY  OF   THE  NEUROLOGICAL  SYMPTOMS          *  TO  MAINTAIN  REGULAR  SLEEP  WAKE  CYCLES.     *   TO  HAVE  ADEQUATE  REST  AND   SLEEP    HOURS.          *    AVOID  USAGE OF   TOBACCO,  EXCESSIVE  ALCOHOL                AND   ILLEGAL   SUBSTANCES,  IF  ANY          *  Maintain   Healthy  Life Style    With   Periodic  Monitoring  Of         Any  Medical  Conditions  Including   Elevated  Blood  Pressure,  Lipid  Profile,       Blood  Sugar levels  And   Heart  Disease.                *   Period   Screening  For  Cancers  Involving  Breast,  Colon,         Lungs  And  Other  Organs  As  Applicable,           In consultation   With  Your  Primary Care Providers.                *  If   There is  Any  Significant  Worsening   Of  Current  Symptoms  And             Or  If    Any additional  New  Neurological  Symptoms  and          Significant  Concerns   Should  Call  911 or  Go  To  Emergency  Department            For  Further  Immediate  Evaluation.

## 2023-02-24 NOTE — PROGRESS NOTES
[facility]  Neurology    [address] [place] [address]  NVQNN:[phone]   Fax: [phone]        SUBJECTIVE:       PATIENT ID:  Leona Douglass is a  RIGHT    HANDED 63 y.o. male. Neurologic Problem  The patient's primary symptoms include clumsiness, focal sensory loss, a loss of balance, memory loss and weakness. Primary symptoms comment: PERIPHERAL  POLYNEUROPATHY  AND  CHRONIC   LUMBAR  PAIN    . This is a chronic problem. The neurological problem developed insidiously. The problem is unchanged. Associated symptoms include back pain. Pertinent negatives include no shortness of breath. Past treatments include bed rest and sleep. The treatment provided no relief. There is no history of a bleeding disorder, a clotting disorder, a CVA, dementia, head trauma, liver disease or seizures. History obtained from  The   PATIENT         and other  available   medical  records   were  Also  reviewed. The  Duration,  Quality,  Severity,  Location,  Timing,  Context,  Modifying  Factors   Of   The   Chief   Complaint       And  Present  Illness  Was   Reviewed   In   Chronological   Manner. PATIENT'S  MAIN  CONCERNS INCLUDE :                     1)     H/O   CHRONIC  PERIPHERAL  POLYNEUROPATHY                                     BOTH LOWER  EXTREMITIES    FOR    5   YEARS                        2)      NO    H/O    DM. 3)      PREVIOUS      H/O      ALCOHOL    ABUSE                                        QUIT  ALCOHOL   FOR   3   YEARS                      4)    H/O     CHRONIC  SMOKING                    PATIENT  AWARE  OF  RISKS  AND                 SIDE  EFFECTS  OF   SMOKING   DISCUSSED. PATIENT   ADVISED   AND  COUNSELED    TO   QUIT  SMOKING.                      5)      H/O   CHRONIC  ANXIETY,  DEPRESSION                                              FOR    7    YEARS -  ON LAMICTAL,  ZOLOFT ,   EFFEXOR                           TO    FOLLOW   WITH   MENTAL  HEALTH PROFESSIONALS                       6)     CO   MORBID  MEDICAL  CONDITIONS                            BEING  FOLLOWED  BY   HIS PCP                       7)       PATIENT    FACTORY     WORKER       FOR    30   YEARS                               INVOLVING  PROLONGED   STANDING                      8)      H/O   CHRONIC  LUMBAR  PAIN    FOR    10    YEARS                        9)     EMG /  NC  STUDIES  IN   FEB. 2022     SHOWED                             A)   MODERATE  PERIPHERAL  POLYNEUROPATHY                          B)    CHRONIC  LOWER  LUMBAR  RADICULOPATHY                                              10)     PREVIOUS      H/O    FALL                                       -   UN EXPLAINED                        11)          SENSORY   ATAXIA                            12)      LABS   WORK  UP   FOR  NEUROPATHY     DID  NOT                                SHOW     ANY  SIGNIFICANT  ABNORMALITIES                                 CHEST  X RAY   SHOWED    NO    ACUTE PATHOLOGY                                  PATIENT STARTED    ON     TRILEPTAL   IN  MAY  2022                               DOSE    WILL  BE  UP  TITRATED      FOR  SYMPTOMATIC  RELIEF                                                          13)        PATIENT     HAD   MRI  LUMBAR  SPINE    FOR                                     CHRONIC    LUMBAR  RADICULAR  PAIN    IN  June 2022                                 SHOWED      MULTI  LEVEL   DEGENERATIVE  DISC  DISEASE                                      -   RESULTS    REVIEWED.                             14)      PATIENT      IN      THE    PAST    INDICATED     HIS   LUMBAR  PAIN                                 AND      NEUROPATHIC   SYMPTOM        ARE     BETTER                                     WITH    MEDICATIONS    AND   THERAPY                                15)      DURING VISIT  IN   FEB. 2023      PATIENT  INDICATED                                    CHRONIC  LUMBAR  PAIN     IS     STABLE                                    WORSENING  OF    NUMBNESS    AND PARAESTHESIAS   BOTH                                      FEET   AND      LEGS                         16)           RECOMMENDED:                                         A)    FALL  PRECAUTIONS                                        B)    AVOID PROLONGED   STANDING                                        C)   AVOID  BACK  STRAINING  ACTIVITIES                                        D)     TO  CONTINUE    MOBIC    15   MG  PO  DAILY                                                     AND  TRILEPTAL   FOR    SYMPTOMATIC  RELIEF                                        E)      FOLLOW  UP     TESTING     AND  LABS                                            F)   PODIATRY      EVALUATIONS                                             G)     ADD   NEURONTIN    300  MG  BID                                            H)    VITAMIN      SUPPLEMENTATION                                                                                          *    EXPECTATIONS,   GOALS  OF    THERAPY ,    PROGNOSIS                                      AND  SIDE  EFFECTS  MEDICATIONS    WERE                                 REVIEWED     AND   DISCUSSED    IN    DETAIL. 17)      VARIOUS  RISK   FACTORS   WERE  REVIEWED   AND   DISCUSSED. PATIENT   HAS  MULTIPLE   MEDICAL, NEUROLOGICAL                        AND   MENTAL HEALTH   PROBLEMS .                                                PRECIPITATING  FACTORS: including  fever/infection, exertion/relaxation, position change, stress,                weather change,   medications/alcohol, time of day/darkness/light  Are  present                                                          MODIFYING  FACTORS:  fever/infection, exertion/relaxation, position change, stress, weather change,               medications/alcohol, time of day/darkness/light  Are  present                Patient   Indicates   The  Presence   And  The  Absence  Of  The  Following    Associated  And             Additional  Neurological    Symptoms:                                Balance  And coordination   problems  present           Gait problems     absent            Headaches      absent              Migraines           absent           Memory problemsabsent              Confusion        absent            Paresthesia   numbness          absent           Seizures  And  Starring  Episodes           absent           Syncope,  Near  syncopal episodes         absent           Speech   problems           absent             Swallowing   Problems      absent            Dizziness,  Light headedness           absent              Vertigo        absent             Generalized   Weakness    absent              focal  Weakness     absent             Tremors         absent              Sleep  Problems     absent             History  Of   Recent  Head  Injury     absent             History  Of   Recent  TIA     absent             History  Of   Recent    Stroke     absent             Neck  Pain   and   Neck muscle  Spasms  absent               Radiating  down   And   Weakness           absent            Lower back   Pain  And     Spasms  present              Radiating    Down   And   Weakness          present                H/OFALLS        present               History  Of   Visual  Symptoms    absent                  Associated   Diplopia       absent                                               Also   Additional   Symptoms   Present    As  Documented    In   The   detailed                  Review  Of  Systems   And    Please   Refer   To    Them for   Additional    Information.                     Any components  That are either  Unobtainable  Or  Limited  In   HPI, ROS  And/or PFSH   Are Due   ToPatient's  Medical  Problems,  Clinical  Condition   and/or lack of                                 other    Alternate   resources. RECORDS   REVIEWED:    historical medical records           INFORMATION   REVIEWED:     MEDICAL   HISTORY,SURGICAL   HISTORY,     MEDICATIONS   LIST,   ALLERGIES AND  DRUG  INTOLERANCES,       FAMILY   HISTORY,  SOCIAL  HISTORY,      PROBLEM  LIST   FOR  PATIENT  CARE   COORDINATION          Past Medical History:   Diagnosis Date    Adult situational stress disorder     Anger     Issues. Depression with anxiety     History of tobacco abuse     Hyperlipidemia     Hypertension     YESICA (obstructive sleep apnea)     cpap noncompliace    Osteoarthritis     back and knees    Suicide attempt [facility]) Age 26         Past Surgical History:   Procedure Laterality Date    COLONOSCOPY  03/21/2013    With EGD with biopsies and polypectomies showing severe esophagitis with ulceration, gastritis with antral erosions, hiatal hernia and small rectal polyp. EYE SURGERY Left     INGUINAL HERNIA REPAIR  06/16/2021    lap left ing [id number]    KNEE ARTHROSCOPY Left     [facility]      For meatal stenosis. UVULECTOMY      WISDOM TOOTH EXTRACTION           Current Outpatient Medications   Medication Sig Dispense Refill    meloxicam (MOBIC) 15 MG tablet Take 1 tablet by mouth daily 30 tablet 1    OXcarbazepine (TRILEPTAL) 150 MG tablet TWO    TABLETS  TWICE  DAILY 120 tablet 2    aspirin 81 MG EC tablet Take 81 mg by mouth      glucosamine-chondroitin 500-400 MG tablet Take 2 tablets by mouth      tamsulosin (FLOMAX) 0.4 MG capsule Take 0.4 mg by mouth every evening      hydrochlorothiazide (HYDRODIURIL) 25 MG tablet TAKE ONE TABLET BY MOUTH ONCE DAILY 90 tablet 0    simvastatin (ZOCOR) 40 MG tablet Take 1 tablet by mouth daily.  90 tablet 1    lamoTRIgine (LAMICTAL) 25 MG tablet Take 1 tablet by mouth 2 times daily. 180 tablet 1    omeprazole (PRILOSEC) 40 MG capsule Take 1 capsule by mouth daily. 90 capsule 1    sertraline (ZOLOFT) 100 MG tablet Take 1.5 tablets by mouth daily. 135 tablet 1    venlafaxine (EFFEXOR-XR) 75 MG XR capsule Take 1 capsule by mouth daily. 90 capsule 1     No current facility-administered medications for this visit. No Known Allergies      Family History   Problem Relation Age of Onset    Other Father         Paralyzed. Heart Attack Father         Myocardial infarction. Coronary Art Dis Father     Stroke Maternal Grandmother     Diabetes Maternal Grandmother     Cancer Sister         Unknown type.     Glaucoma Mother     Sudden Death Sister         shot by  during divorce         Social History     Socioeconomic History    Marital status:      Spouse name: Not on file    Number of children: Not on file    Years of education: Not on file    Highest education level: Not on file   Occupational History    Not on file   Tobacco Use    Smoking status: Every Day     Packs/day: 1.00     Years: 40.00     Pack years: 40.00     Types: Cigarettes     Start date: 1/1/1982    Smokeless tobacco: Never   Substance and Sexual Activity    Alcohol use: No    Drug use: No    Sexual activity: Not on file   Other Topics Concern    Not on file   Social History Narrative    Not on file     Social Determinants of Health     Financial Resource Strain: Not on file   Food Insecurity: Not on file   Transportation Needs: Not on file   Physical Activity: Not on file   Stress: Not on file   Social Connections: Not on file   Intimate Partner Violence: Not on file   Housing Stability: Not on file       Vitals:    02/24/23 0921   BP: 122/74         Wt Readings from Last 3 Encounters:   02/24/23 230 lb (104.3 kg)   10/21/22 231 lb 6.4 oz (105 kg)   07/22/22 238 lb (108 kg)         BP Readings from Last 3 Encounters:   02/24/23 122/74   10/21/22 126/74   07/22/22 124/68           Hematology and Coagulation    Lab Results   Component Value Date/Time    WBC 8.8 05/09/2022 10:38 AM    RBC 5.24 05/09/2022 10:38 AM    HGB 16.1 05/09/2022 10:38 AM    HCT 48.2 05/09/2022 10:38 AM    MCV 92.0 05/09/2022 10:38 AM    MCH 30.7 05/09/2022 10:38 AM    MCHC 33.4 05/09/2022 10:38 AM    RDW 14.4 05/09/2022 10:38 AM     05/09/2022 10:38 AM    MPV 10.3 05/09/2022 10:38 AM       Chemistries    Lab Results   Component Value Date/Time     05/09/2022 10:38 AM    K 3.8 05/09/2022 10:38 AM     05/09/2022 10:38 AM    CO2 27 05/09/2022 10:38 AM    BUN 16 05/09/2022 10:38 AM    CREATININE 0.81 05/09/2022 10:38 AM    CALCIUM 9.5 05/09/2022 10:38 AM    PROT 7.2 05/09/2022 10:38 AM    PROT 7.6 05/09/2022 10:38 AM    LABALBU 4.8 05/09/2022 10:38 AM    BILITOT 0.51 05/09/2022 10:38 AM    ALKPHOS 55 05/09/2022 10:38 AM    AST 25 05/09/2022 10:38 AM    ALT 16 05/09/2022 10:38 AM     Lab Results   Component Value Date/Time    ALKPHOS 55 05/09/2022 10:38 AM    ALT 16 05/09/2022 10:38 AM    AST 25 05/09/2022 10:38 AM    PROT 7.2 05/09/2022 10:38 AM    PROT 7.6 05/09/2022 10:38 AM    BILITOT 0.51 05/09/2022 10:38 AM    LABALBU 4.8 05/09/2022 10:38 AM     Lab Results   Component Value Date/Time    BUN 16 05/09/2022 10:38 AM    CREATININE 0.81 05/09/2022 10:38 AM     Lab Results   Component Value Date/Time    CALCIUM 9.5 05/09/2022 10:38 AM     Lab Results   Component Value Date/Time    AST 25 05/09/2022 10:38 AM    ALT 16 05/09/2022 10:38 AM           Review of Systems   HENT:  Negative for congestion, dental problem, drooling, ear discharge, ear pain, facial swelling, hearing loss, mouth sores, nosebleeds, postnasal drip, sinus pressure, sore throat, tinnitus, trouble swallowing and voice change. Respiratory:  Negative for apnea, cough, choking, chest tightness, shortness of breath and wheezing. Musculoskeletal:  Positive for back pain. Neurological:  Positive for weakness and loss of balance.   Psychiatric/Behavioral:  Positive for memory loss.        OBJECTIVE:      Physical Exam  Constitutional:       Appearance: He is well-developed.   HENT:      Head: Normocephalic and atraumatic. No raccoon eyes or Reynoso's sign.      Right Ear: External ear normal.      Left Ear: External ear normal.      Nose: Nose normal.   Eyes:      Conjunctiva/sclera: Conjunctivae normal.      Pupils: Pupils are equal, round, and reactive to light.   Neck:      Thyroid: No thyroid mass or thyromegaly.      Vascular: No carotid bruit.      Trachea: No tracheal deviation.      Meningeal: Brudzinski's sign and Kernig's sign absent.   Cardiovascular:      Rate and Rhythm: Regular rhythm.   Pulmonary:      Effort: Pulmonary effort is normal.   Musculoskeletal:         General: No tenderness.      Cervical back: Normal range of motion and neck supple. No rigidity. No muscular tenderness. Normal range of motion.   Skin:     General: Skin is warm.      Coloration: Skin is not pale.      Findings: No erythema or rash.      Nails: There is no clubbing.   Psychiatric:         Attention and Perception: He is attentive.         Mood and Affect: Mood is anxious. Mood is not depressed. Affect is not labile, blunt or inappropriate.         Speech: He is communicative. Speech is not rapid and pressured, delayed, slurred or tangential.         Behavior: Behavior is not agitated, slowed, aggressive, withdrawn, hyperactive or combative. Behavior is cooperative.         Thought Content: Thought content is not paranoid or delusional. Thought content does not include homicidal or suicidal ideation. Thought content does not include homicidal or suicidal plan.         Cognition and Memory: Memory is not impaired. He does not exhibit impaired recent memory or impaired remote memory.         Judgment: Judgment is not impulsive or inappropriate.           NEUROLOGICALEXAMINATION :       A) MENTAL STATUS:                   Alert and  oriented  To time,  place  And  Person. No Aphasia. No  Dysarthria. Able   To  Follow     SIMPLE    commands   without   Any  Difficulty. No right  To left confusion. Normal  Speech  And language function. Insight and  Judgment ,Fund  Of  Knowledge   within normal limits                Recent  And  Remote memory  within   normal limits                Attention &  Concentration are within   normal limits                                                   B) CRANIAL NERVES :        CN : Visual  Acuity  And  Visual fields  within normal limits               Fundi  Could  Not  Be  Could  Not  Be  Evaluated. 3,4,6 CN : Both  Pupils are   Reactive and  Equal.  Movements  Are  Intact. No  Nystagmus. No  ANTWAN. No  Afferent  Pupillary  Defect noted. 5 CN :  Normal  Facial sensations and Corneal  Reflexes           7 CN:  Normal  Facial  Symmetry  And  Strength. No facial  Weakness. 8 CN :  Hearing  Appears within normal limits          9, 10 CN: Normal   spontaneous, reflex   palate   movements         11 CN:   Normal  Shoulder  shrug and  strength         12 CN :   Normal  Tongue movements and  Tongue  In midline                        No tongue   Fasciculations or atrophy       C) MOTOR  EXAM:                 Strength  In upper  And  Lower   extremities   within   normal limits               No  Drift. No  Atrophy               Rapid   alternating  And  repetitions  Movements  within   normal limits                 Muscle  Tone  In upper  And  Lower  Extremities  normal                No rigidity. No  Spasticity. Bradykinesia   absent                 No  Asterixis.               Sustention  Tremor , Resting   Tremor   absent                    No   other  Abnormal  Movements noted           D) SENSORY :               Light   touch, pinprick,   position  And  Vibration DECREASED                          BELOW  KNEE  LEVELS   BILATERALLY        E) REFLEXES:                   Deep  Tendon  Reflexes   DECREASED                   No  pathological  Reflexes  Bilaterally. F) COORDINATION  AND  GAIT :                                Station and  Gait  normal                              Romberg 's test   POSITIVE                            Ataxia negative      ASSESSMENT:      Patient Active Problem List   Diagnosis    Hypertension    Hyperlipidemia    Depression with anxiety    YSEICA (obstructive sleep apnea)    Osteoarthritis    Smoker    Idiopathic peripheral neuropathy    Numbness and tingling of both legs below knees    Neuropathic pain    Chronic obstructive pulmonary disease (HCC)    Sensory ataxia    H/O fall    Chronic lumbar radiculopathy    Lumbar degenerative disc disease       CT OF THE LUMBAR SPINE WITHOUT CONTRAST  7/11/2022       TECHNIQUE:   CT of the lumbar spine was performed without the administration of   intravenous contrast. Multiplanar reformatted images are provided for review. Adjustment of mA and/or kV according to patient size was utilized. Automated   exposure control, iterative reconstruction, and/or weight based adjustment of   the mA/kV was utilized to reduce the radiation dose to as low as reasonably   achievable. COMPARISON:   MRI of the lumbar spine February 28, 2022. HISTORY:   ORDERING SYSTEM PROVIDED HISTORY: Numbness and tingling of both legs below   knees   TECHNOLOGIST PROVIDED HISTORY:   Reason for Exam: Numbness and tingling of both legs       FINDINGS:   Retroperitoneum/soft tissues: Mass projecting from the mid aspect of the   right kidney measuring proximally 2 cm diameter and noted on previous MRI to   represent a cyst.       Calcifications present within the wall of the aorta and branching vessels. Vacuum joint phenomena in portions of the right and left SI joints.        5 lumbar type vertebral bodies. Straightening of the normal lumbar   curvature. Approximately 2 mm posterior listhesis L3 on L4 and L4 on L5 and   L5 on S1 with vacuum joint phenomenon L3-4, L4-5, and L5-S1. Spondylitic   endplate degenerative changes throughout, greater anteriorly at L1-2. Narrowing of all of the disc spaces but somewhat greater at L4-5 and L5-S1. T12-L1: Some degenerative changes in the endplates and facets. No   significant narrowing of the central canal or the neural foramina. L1-2: Degenerative changes in the endplates and facets. Mild facet   hypertrophy. No significant narrowing of the central canal or the neural   foramina. Mild narrowing of the disc space itself. L2-3: Disc dehydration. Degenerative changes in the endplates and facets. Mild facet hypertrophy. No significant narrowing of the central canal.   Narrowing of the inferior recesses. L3-4: Disc dehydration. Degenerative changes in the endplates and facets. Facet hypertrophy. Narrowing of the right neural foramen and the inferior   recesses. Bulging disc containing calcification posteriorly as well as   spondylitic changes. No significant narrowing of the central canal.       L4-5: Disc dehydration. Narrowing of the disc space. Degenerative changes   in the endplates and facets. Facet hypertrophy. Right lateral spondylitic   changes and calcification in a right lateral disc causes marked narrowing of   the right neural foramen. Mild narrowing of the left neural foramen. L5-S1: Disc dehydration. Narrowing of the disc space. Degenerative changes   in the endplates and facets with a degree of facet hypertrophy. Spondylitic   spurring is greater toward the right and causes marked narrowing of the right   neural foramen. Mild narrowing of the left neural foramen.   No significant   narrowing of central canal.           Impression   Degenerative changes in the lumbar spine as described in detail above.           VISITING DIAGNOSIS:      ICD-10-CM    1. Idiopathic peripheral neuropathy  G60.9       2. Chronic lumbar radiculopathy  M54.16       3. Lumbar degenerative disc disease  M51.36       4. Numbness and tingling of both legs below knees  R20.0     R20.2       5. H/O fall  Z91.81       6. Smoker  F17.200       7. Sensory ataxia  R27.8       8. Primary hypertension  I10       9. Neuropathic pain  M79.2       10. Depression with anxiety  F41.8                    CONCERNS   &   INCREASED   RISK   FOR         *  MONONEUROPATHIES,   RADICULOPATHY        *    BALANCE PROBLMES   AND  FALL                VARIOUS  RISK   FACTORS   WERE  REVIEWED   AND   DISCUSSED. *  PATIENT   HAS  MULTIPLE   MEDICAL, NEUROLOGICAL                        AND   MENTAL HEALTH   PROBLEMS . PATIENT'S   MANAGEMENT  IS  CHALLENGING. PLAN:                         Rhina Juanito  Of  The  Diagnoses,  The  Management & Treatment  Options            AND    Care  plan  Were          Reviewed and   Discussed   With  patient. * Goals  And  Expectations  Of  The  Therapy  Discussed   And  Reviewed. *   Benefits   And   Side  Effect  Profile  Of  Medication/s   Were   Discussed                * Need   For  Further   Follow up For  The  Various  Problems Were  discussed. * Results  Of  The  Previous  Diagnostic tests were reviewed and  discussed                   Medical  Decision  Making  Was  Made  Based on the   Complexity  Of  Above  Mentioned  Diagnoses,    Data reviewed             And    Risk  Of  Significant   Co morbidities and   complicating   Factors.                  Medical  Decision  Was     High     Complexity   Due   To  The  Patient's  Multiple  Symptoms  &  Disease,            Complex  Treatment  Regimen,  Multiple medications           and   Risk  Of   Side  Effects,  Difficulty  In  Medication  Management  And  Diagnostic  Challenges           In  View  Of  The  Associated   Co Morbid  Conditions   And  Problems. * FALL   PRECAUTIONS. THESE  REVIEWED   AND  DISCUSSED      *    AVOID PROLONGED     STANDING          *     MAY   REST        AT    WORK     AS   NEEDED          *   BE  CAREFUL  WITH  ACTIVITIES          *   AVOID  BACK  STRAINING  ACTIVITIES              *   ADEQUATE   FLUID  INTAKE   AND  ELECTROLYTE  BALANCE           * KEEP  DAIRY  OF   THE  NEUROLOGICAL  SYMPTOMS        RECORDING THE    DURATION  AND  FREQUENCY. *TO  MAINTAIN  REGULAR  SLEEP  WAKE  CYCLES. *   TO  HAVE  ADEQUATE  REST  AND   SLEEP    HOURS.              *    AVOID  ANY USAGE OF    TOBACCO,          AVOID  EXCESSIVE  ALCOHOL  AND   ILLEGAL   SUBSTANCES          *  CONTINUE   MEDICATIONS    PRESCRIBED       AS    RECOMMENDED       *   Compliance   With  Medications   And  Instructions          *    Antiplatelet  therapy    As   Recommended  Was   Discussed      *    Prophylactic  Use   Of     Vitamin   B   Complex,  Folic  Acid,    Vitamin  B12    Multivitamin,       Calcium  With  magnesium  And  Vit D    Supplementations   Over  The  Counter  Discussed             *FOOT  CARE, DAILY  INSPECTION  OF  FEET   AND         PERIODIC  PODIATRY EVALUATIONS . Controlled Substances Monitoring: Periodic Controlled Substance Monitoring: Possible medication side effects, risk of tolerance/dependence & alternative treatments discussed. , Assessed functional status.  Gabriele Arechiga MD)            Orders Placed This Encounter   Procedures    CBC    ANNA Screen with Reflex    Rheumatoid Factor    Comprehensive Metabolic Panel    Vitamin B12 & Folate    [facility]    EMG       Orders Placed This Encounter   Medications    meloxicam (MOBIC) 15 MG tablet     Sig: Take 1 tablet by mouth daily     Dispense:  30 tablet     Refill:  1    OXcarbazepine (TRILEPTAL) 150 MG tablet     Sig: TWO    TABLETS  TWICE  DAILY     Dispense: 120 tablet     Refill:  2    gabapentin (NEURONTIN) 300 MG capsule     Sig: One    capsule   in    the   evening    and  bed   time    as  needed     Dispense:  90 capsule     Refill:  1               *PATIENT   TO  FOLLOW  UP  WITH   PRIMARY  CARE         OTHER  CONSULTANTS  AS  BEFORE.               *TO  FOLLOW  WITH   MENTAL  HEALTH  PROFESSIONALS ,  INCLUDING            PSYCHOLOGICAL  COUNSELING   AND  PSYCHIATRIC  EVALUTIONS,                     *  Maintain   Healthy  Life Style    With   Periodic  Monitoring  Of          Any  Medical  Conditions  Including   Elevated  Blood  Pressure,  Lipid  Profile,        Blood  Sugar levels  AndHeart  Disease. *   Period   Screening  For  Cancers  Involving  Breast,  Colon,         Lungs  And  Other  Organs  As  Applicable,  In consultation   With  Your  Primary Care Providers. *Second  Neurological  Opinion  And  Evaluations  In  [facility]  Setting  If  Patient  Is  Interested. * Please   Contact   Neurology  Clinic   Early   If   Are  Any  New  Neurological   And  Any neurological  Concerns. *  If  The  Patient remains  Neurologically  Stable   Return   To  [facility] Neurology Department   IN       1- 2         MONTHS  TIME   FOR  FURTHER              FOLLOW UP.                       *   The  Neurological   Findings,  Possible  Diagnosis,  Differential diagnoses                    And  Options  For    Further   Investigations                   And  management   Are  Discussed  Comprehensively. Medications   And  Prescription   Risks  And  Side effects  Are   Also  Discussed.                      *  If   There is  Any  Significant  Worsening   Of  Current  Symptoms  And  Or                  If patient  Develops   Any additional  New  NeurologicalSymptoms                  Or  Significant  Concerns   Should  Call  911 or  Go  To  Emergency  Department          For  Further  Immediate  Evaluation and  management .                         The   Above  Were  Reviewed  With  PATIENT   and                          questions  Answered  In  Detail.                                                                                       Electronically signed by   ELIZABETH HERBERT M.D., FAAN.     Board Certified in  Neurology &  In  Brain Injury Medicine   American Board of Psychiatry and Neurology (ABPN)      DISCLAIMER:   Although every effort was made to ensure the accuracy of this  electronictranscription, some errors in transcription may have occurred.      GENERAL PATIENT INSTRUCTIONS:     A Healthy Lifestyle: Care Instructions  Your Care Instructions  A healthy lifestyle can help you feel good, stay at ahealthy weight, and have plenty of energy for both work and play. A healthy lifestyle is something you can share with your whole family.  A healthy lifestyle also can lower your risk for serious health problems, such ashigh blood pressure, heart disease, and diabetes.  You can follow a few steps listed below to improve your health and the health of your family.  Follow-up careis a key part of your treatment and safety. Be sure to make and go to all appointments, and call your doctor if you are having problems. It’s also a good idea to know your test results and keep a list of the medicines you take.  How can you care for yourself at home?  Do not eat too much sugar, fat, or fast foods. You can still have dessert and treats nowand then. The goal is moderation.  Start small to improve your eating habits. Pay attention to portion sizes, drink less juice and soda pop, and eat more fruits and vegetables.  Eat a healthy amount of food. A 3-ounce serving of meat, for example, is about the size of a deck of cards. Fill the rest of your plate with vegetables and whole grains.  Limit theamount of soda and sports drinks you have every day. Drink more water when you are  thirsty. Eat at least 5 servings of fruits and vegetables every day. It may seem like a lot, but it is not hard to reach this goal. Aserving or helping is 1 piece of fruit, 1 cup of vegetables, or 2 cups of leafy, raw vegetables. Have an apple or some carrot sticks as an afternoon snack instead of a candy bar. Try to have fruits and/or vegetables at everymeal.  Make exercise part of your daily routine. You may want to start with simple activities, such as walking, bicycling, or slow swimming. Try carmen active 30 to 60 minutes every day. You do not need to do all 30 to 60 minutes all at once. For example, you can exercise 3 times a day for 10 or 20 minutes. Moderate exercise is safe for most people, but it is always agood idea to talk to your doctor before starting an exercise program.  Keep moving. Yanet Bear the lawn, work in the garden, or Picklive. Take the stairs instead of the elevator at work. If you smoke, quit. Peoplewho smoke have an increased risk for heart attack, stroke, cancer, and other lung illnesses. Quitting is hard, but there are ways to boost your chance of quitting tobacco for good. Use nicotine gum, patches, or lozenges. Ask your doctor about stop-smoking programs and medicines. Keep trying. In addition to reducing your risk of diseases in the future, you will notice some benefits soon after you stop using tobacco. If you have shortness of breath or asthma symptoms, they will likely getbetter within a few weeks after you quit. Limit how much alcohol you drink. Moderate amounts of alcohol (up to 2 drinks a day for men, 1drink a day for women) are okay. But drinking too much can lead to liver problems, high blood pressure, and other health problems. health  If you have a family, there are many things you can do together to improve your health. Eat meals together as a family as often as possible. Eat healthy foods.  This includes fruits, vegetables, lean meats and dairy, and whole grains. Include your family in your fitness plan. Most peoplethink of activities such as jogging or tennis as the way to fitness, but there are many ways you and your family can be more active. Anything that makes you breathe hard and gets your heart pumping is exercise. Here are sometips:  Walk to do errands or to take your child to school or the bus. Go for a family bike ride after dinner instead of watching TV. Where can you learn more? Go toBookmycabtps://[email or web address]. org and sign in to your [employer] account. Enter M728 in the Search HealthInformation box to learn more about \"A Healthy Lifestyle: Care Instructions. \"     If you do not have anaccount, please click on the \"Sign Up Now\" link. Current as of: July 26, 2016  Content Version: 11.2  © 6473-6916 [employer]. Care instructions adapted under license by [facility] [address] If you have questions about a medical condition or this instruction, always ask your healthcare professional. [employer] disclaims any warranty or liability for your use of this information.

## 2023-03-03 ENCOUNTER — OFFICE VISIT (OUTPATIENT)
Dept: PODIATRY | Age: 63
End: 2023-03-03

## 2023-03-03 VITALS
RESPIRATION RATE: 20 BRPM | BODY MASS INDEX: 31.6 KG/M2 | WEIGHT: 233 LBS | DIASTOLIC BLOOD PRESSURE: 72 MMHG | HEART RATE: 72 BPM | SYSTOLIC BLOOD PRESSURE: 122 MMHG

## 2023-03-03 DIAGNOSIS — M79.2 NEUROPATHIC PAIN: Primary | ICD-10-CM

## 2023-03-03 DIAGNOSIS — G62.1 ALCOHOLIC PERIPHERAL NEUROPATHY (HCC): ICD-10-CM

## 2023-03-03 NOTE — PROGRESS NOTES
Subjective:  Makayla Armijo is a 63 y.o. male who presents to the office today complaining of funny feelings in both feet. Pt also gets temperature changes. .  Symptoms began  year(s) ago. Patient relates pain is Present. Pain is rated 4 out of 10 and is described as waxing and waning. Treatments prior to today's visit include: gabapentin started 1 week ago. .  Currently denies F/C/N/V. No Known Allergies    Past Medical History:   Diagnosis Date    Adult situational stress disorder     Anger     Issues. Depression with anxiety     History of tobacco abuse     Hyperlipidemia     Hypertension     YESICA (obstructive sleep apnea)     cpap noncompliace    Osteoarthritis     back and knees    Suicide attempt [facility]) Age 26       Prior to Admission medications    Medication Sig Start Date End Date Taking? Authorizing Provider   meloxicam (MOBIC) 15 MG tablet Take 1 tablet by mouth daily 2/24/23  Yes Sandeep Armando MD   OXcarbazepine (TRILEPTAL) 150 MG tablet TWO    TABLETS  TWICE  DAILY 2/24/23  Yes Sandeep Armando MD   gabapentin (NEURONTIN) 300 MG capsule One    capsule   in    the   evening    and  bed   time    as  needed 2/24/23 [birth year] Yes Sandeep Armando MD   aspirin 81 MG EC tablet Take 81 mg by mouth   Yes Historical Provider, MD   glucosamine-chondroitin 500-400 MG tablet Take 2 tablets by mouth   Yes Historical Provider, MD   tamsulosin (FLOMAX) 0.4 MG capsule Take 0.4 mg by mouth every evening   Yes Historical Provider, MD   hydrochlorothiazide (HYDRODIURIL) 25 MG tablet TAKE ONE TABLET BY MOUTH ONCE DAILY 2/15/15  Yes RY Winn   simvastatin (ZOCOR) 40 MG tablet Take 1 tablet by mouth daily. 10/23/14  Yes RY Winn   lamoTRIgine (LAMICTAL) 25 MG tablet Take 1 tablet by mouth 2 times daily. 10/23/14  Yes RY Winn   omeprazole (PRILOSEC) 40 MG capsule Take 1 capsule by mouth daily.  10/23/14  Yes RY Winn   sertraline (ZOLOFT) 100 MG tablet Take 1.5 tablets by mouth daily. 10/23/14  Yes RY Farmer   venlafaxine (EFFEXOR-XR) 75 MG XR capsule Take 1 capsule by mouth daily. 10/23/14  Yes RY Farmer       Past Surgical History:   Procedure Laterality Date    COLONOSCOPY  03/21/2013    With EGD with biopsies and polypectomies showing severe esophagitis with ulceration, gastritis with antral erosions, hiatal hernia and small rectal polyp. EYE SURGERY Left     INGUINAL HERNIA REPAIR  06/16/2021    lap left ing [id number]    KNEE ARTHROSCOPY Left     Meniscus repair, [address] [facility], Dr. Rossy Liu. MEATOTOMY      For meatal stenosis. UVULECTOMY      WISDOM TOOTH EXTRACTION         Family History   Problem Relation Age of Onset    Other Father         Paralyzed. Heart Attack Father         Myocardial infarction. Coronary Art Dis Father     Stroke Maternal Grandmother     Diabetes Maternal Grandmother     Cancer Sister         Unknown type. Glaucoma Mother     Sudden Death Sister         shot by  during divorce       Social History     Tobacco Use    Smoking status: Every Day     Packs/day: 1.00     Years: 40.00     Pack years: 40.00     Types: Cigarettes     Start date: 1/1/1982    Smokeless tobacco: Never   Substance Use Topics    Alcohol use: Not Currently       ROS: All 14 ROS systems reviewed and pertinent positives noted above, all others negative. Lower Extremity Physical Examination:     Vitals:   Vitals:    03/03/23 0857   BP: 122/72   Pulse: 72   Resp: 20     General: AAO x 3 in NAD. Vascular: DP and PT pulses palpable 2/4, bilateral.  CFT <3 seconds, bilateral.  Hair growth present to the level of the digits, bilateral.  Edema absent, bilateral.  Varicosities absent, bilateral. Erythema absent, bilateral. Distal Rubor absent bilateral.  Temperature within normal limits bilateral. Hyperpigmentation absent bilateral. No atrophic skin.    Neurological: Sensation Impaired to light touch to level of digits, bilateral.  Protective sensation intact  10/10 sites via 5.07/10g Lilburn-Mark Monofilament, bilateral.  negative Tinel's, bilateral.  negative Valleix sign, bilateral.  Vibratory abnormal  bilateral.  Reflexes Decreased bilateral.  Paresthesias positive. Dysthesias positive. Sharp/dull abnormal  bilateral.   Musculoskeletal: Muscle strength 5/5, bilateral.  Pain absent upon palpation bilateral. Normal medial longitudinal arch, bilateral.  Ankle ROM within normal limits,bilateral.  1st MPJ ROM within normal limits, bilateral.  Dorsally contracted digits absent. No other foot deformities. Integument: Warm, dry, supple, bilateral.  Open lesion absent, bilateral.  Interdigital maceration absent to web spaces bilateral.  Nails within normal limits. Fissures absent, bilateral. Hyperkeratotic tissue is absent. Asessment: Patient is a 63 y.o. male with:    Diagnosis Orders   1. Neuropathic pain        2. Alcoholic peripheral neuropathy ([facility] [address]            Plan: Patient examined and evaluated. Current condition and treatment options discussed in detail. Treatment options for neuropathy were discussed. Pt given options from Rx metanx, gabapentin, elavil, or lyrica. Patient may also consider OTC Vit B complex. Long term progression of condition along with risks and complications were also discussed. Patient encouraged to follow with Dr. Joni Rivero regarding gabapentin. Patient advised may need dose adjustment. Patient encouraged to continue vitamin B complex daily vitamins. If pt has any increase instability with walking he should use assistive offloading device at all times WB   Appropiate shoe gear discussed. Pt should use more stable shoes. Due to level of pain/deformity, it is in my medical opinion that patient will benefit from and is medically necessary for pre payal orthotics at this time. Pt was given the option of pre fabricated insoles.   Pt was advised on appropriate use and how they can help their condition. Pt should use these in shoe gear 100% of the time WB. Patient has low level medical decision making. Patient is acute uncomplicated condition. Patient has no new testing or prescription. 1 test reviewed. Low risk morbidity at this time  Contact office with any questions/problems/concerns.   RTC in prn

## 2023-03-19 ENCOUNTER — APPOINTMENT (OUTPATIENT)
Dept: GENERAL RADIOLOGY | Age: 63
End: 2023-03-19
Payer: COMMERCIAL

## 2023-03-19 ENCOUNTER — HOSPITAL ENCOUNTER (EMERGENCY)
Age: 63
Discharge: HOME | End: 2023-03-19
Attending: EMERGENCY MEDICINE
Payer: COMMERCIAL

## 2023-03-19 DIAGNOSIS — R06.02 SHORTNESS OF BREATH: Primary | ICD-10-CM

## 2023-03-19 LAB
ABSOLUTE EOS #: 0.29 K/UL (ref 0–0.44)
ABSOLUTE IMMATURE GRANULOCYTE: <0.03 K/UL (ref 0–0.3)
ABSOLUTE LYMPH #: 3.85 K/UL (ref 1.1–3.7)
ABSOLUTE MONO #: 1.08 K/UL (ref 0.1–1.2)
ANION GAP SERPL CALCULATED.3IONS-SCNC: 17 MMOL/L (ref 9–17)
BASOPHILS # BLD: 0 % (ref 0–2)
BASOPHILS ABSOLUTE: 0.04 K/UL (ref 0–0.2)
BUN SERPL-MCNC: 17 MG/DL (ref 8–23)
BUN/CREAT BLD: 19 (ref 9–20)
CALCIUM SERPL-MCNC: 10.4 MG/DL (ref 8.6–10.4)
CHLORIDE SERPL-SCNC: 108 MMOL/L (ref 98–107)
CO2 SERPL-SCNC: 19 MMOL/L (ref 20–31)
CREAT SERPL-MCNC: 0.91 MG/DL (ref 0.7–1.2)
EOSINOPHILS RELATIVE PERCENT: 3 % (ref 1–4)
FLUAV AG SPEC QL: NEGATIVE
FLUBV AG SPEC QL: NEGATIVE
GFR SERPL CREATININE-BSD FRML MDRD: >60 ML/MIN/1.73M2
GLUCOSE SERPL-MCNC: 121 MG/DL (ref 70–99)
HCT VFR BLD AUTO: 47.8 % (ref 40.7–50.3)
HGB BLD-MCNC: 16 G/DL (ref 13–17)
IMMATURE GRANULOCYTES: 0 %
LYMPHOCYTES # BLD: 40 % (ref 24–43)
MCH RBC QN AUTO: 30 PG (ref 25.2–33.5)
MCHC RBC AUTO-ENTMCNC: 33.5 G/DL (ref 25.2–33.5)
MCV RBC AUTO: 89.5 FL (ref 82.6–102.9)
MONOCYTES # BLD: 11 % (ref 3–12)
NRBC AUTOMATED: 0 PER 100 WBC
PDW BLD-RTO: 13.3 % (ref 11.8–14.4)
PLATELET # BLD AUTO: 266 K/UL (ref 138–453)
PMV BLD AUTO: 10 FL (ref 8.1–13.5)
POTASSIUM SERPL-SCNC: 3.8 MMOL/L (ref 3.7–5.3)
RBC # BLD: 5.34 M/UL (ref 4.21–5.77)
SARS-COV-2 RDRP RESP QL NAA+PROBE: NOT DETECTED
SEG NEUTROPHILS: 46 % (ref 36–65)
SEGMENTED NEUTROPHILS ABSOLUTE COUNT: 4.39 K/UL (ref 1.5–8.1)
SODIUM SERPL-SCNC: 144 MMOL/L (ref 135–144)
SPECIMEN DESCRIPTION: NORMAL
TROPONIN I SERPL DL<=0.01 NG/ML-MCNC: 12 NG/L (ref 0–22)
WBC # BLD AUTO: 9.7 K/UL (ref 3.5–11.3)

## 2023-03-19 PROCEDURE — 36415 COLL VENOUS BLD VENIPUNCTURE: CPT

## 2023-03-19 PROCEDURE — 93005 ELECTROCARDIOGRAM TRACING: CPT | Performed by: EMERGENCY MEDICINE

## 2023-03-19 PROCEDURE — 87804 INFLUENZA ASSAY W/OPTIC: CPT

## 2023-03-19 PROCEDURE — 71045 X-RAY EXAM CHEST 1 VIEW: CPT

## 2023-03-19 PROCEDURE — 80048 BASIC METABOLIC PNL TOTAL CA: CPT

## 2023-03-19 PROCEDURE — 85025 COMPLETE CBC W/AUTO DIFF WBC: CPT

## 2023-03-19 PROCEDURE — 84484 ASSAY OF TROPONIN QUANT: CPT

## 2023-03-19 PROCEDURE — 6370000000 HC RX 637 (ALT 250 FOR IP): Performed by: EMERGENCY MEDICINE

## 2023-03-19 PROCEDURE — 99285 EMERGENCY DEPT VISIT HI MDM: CPT

## 2023-03-19 PROCEDURE — 87635 SARS-COV-2 COVID-19 AMP PRB: CPT

## 2023-03-19 RX ORDER — LORAZEPAM 0.5 MG/1
1 TABLET ORAL ONCE
Status: COMPLETED | OUTPATIENT
Start: 2023-03-19 | End: 2023-03-19

## 2023-03-19 RX ADMIN — LORAZEPAM 1 MG: 0.5 TABLET ORAL at 21:20

## 2023-03-19 ASSESSMENT — LIFESTYLE VARIABLES
HOW OFTEN DO YOU HAVE A DRINK CONTAINING ALCOHOL: NEVER
HOW MANY STANDARD DRINKS CONTAINING ALCOHOL DO YOU HAVE ON A TYPICAL DAY: PATIENT DOES NOT DRINK

## 2023-03-19 ASSESSMENT — PAIN - FUNCTIONAL ASSESSMENT: PAIN_FUNCTIONAL_ASSESSMENT: NONE - DENIES PAIN

## 2023-03-20 ENCOUNTER — HOSPITAL ENCOUNTER (OUTPATIENT)
Age: 63
Discharge: HOME | End: 2023-03-20
Payer: COMMERCIAL

## 2023-03-20 VITALS
HEART RATE: 60 BPM | SYSTOLIC BLOOD PRESSURE: 135 MMHG | WEIGHT: 230 LBS | DIASTOLIC BLOOD PRESSURE: 75 MMHG | BODY MASS INDEX: 31.15 KG/M2 | OXYGEN SATURATION: 96 % | RESPIRATION RATE: 13 BRPM | TEMPERATURE: 97.6 F | HEIGHT: 72 IN

## 2023-03-20 LAB
D DIMER BLD IA.RAPID-MCNC: 0.39 MG/L FEU (ref 0–0.59)
EKG ATRIAL RATE: 53 BPM
EKG P AXIS: 67 DEGREES
EKG P-R INTERVAL: 190 MS
EKG Q-T INTERVAL: 456 MS
EKG QRS DURATION: 114 MS
EKG QTC CALCULATION (BAZETT): 427 MS
EKG R AXIS: 44 DEGREES
EKG T AXIS: 34 DEGREES
EKG VENTRICULAR RATE: 53 BPM

## 2023-03-20 PROCEDURE — 36415 COLL VENOUS BLD VENIPUNCTURE: CPT

## 2023-03-20 PROCEDURE — 85379 FIBRIN DEGRADATION QUANT: CPT

## 2023-03-20 NOTE — ED PROVIDER NOTES
eMERGENCY dEPARTMENT eNCOUnter      Pt Name: Abraham Lopes  MRN: [id number]  [place] 1960  Date of evaluation: 3/19/2023      CHIEF COMPLAINT       Chief Complaint   Patient presents with    Shortness of Breath    Chest Pain         HISTORY OF PRESENT ILLNESS    Abraham Lopes is a 63 y.o. male who presents with shortness of breath chest discomfort patient states he has been sick somewhat since Wednesday but today he started developing what he felt was increasing shortness of breath says prior to this he was having just generalized body aches headaches cough he did take COVID test at home which was negative is here for evaluation he states he is actually feeling much better from a shortness of breath he says he has not taken his medicines wonder whether this is mostly anxiety        REVIEW OF SYSTEMS       Review of systems are all reviewed and negative except stated above in HPI    [address]    has a past medical history of Adult situational stress disorder, Anger, Depression with anxiety, History of tobacco abuse, Hyperlipidemia, Hypertension, YESICA (obstructive sleep apnea), Osteoarthritis, and Suicide attempt ([facility] [address] SURGICAL HISTORY      has a past surgical history that includes Uvulectomy; Knee arthroscopy (Left); [place] tooth extraction; Eye surgery (Left); Colonoscopy (03/21/2013); meatotomy; and Inguinal hernia repair (06/16/2021).     CURRENT MEDICATIONS       Discharge Medication List as of 3/19/2023 10:10 PM        CONTINUE these medications which have NOT CHANGED    Details   meloxicam (MOBIC) 15 MG tablet Take 1 tablet by mouth daily, Disp-30 tablet, R-1Normal      OXcarbazepine (TRILEPTAL) 150 MG tablet TWO    TABLETS  TWICE  DAILY, Disp-120 tablet, R-2Normal      gabapentin (NEURONTIN) 300 MG capsule One    capsule   in    the   evening    and  bed   time    as  needed, Disp-90 capsule, R-1Normal      aspirin 81 MG EC tablet Take 81 mg by mouthHistorical Med      glucosamine-chondroitin

## 2023-04-24 ENCOUNTER — OFFICE VISIT (OUTPATIENT)
Dept: NEUROLOGY | Age: 63
End: 2023-04-24
Payer: COMMERCIAL

## 2023-04-24 VITALS
SYSTOLIC BLOOD PRESSURE: 116 MMHG | WEIGHT: 229 LBS | BODY MASS INDEX: 31.02 KG/M2 | HEART RATE: 69 BPM | OXYGEN SATURATION: 99 % | DIASTOLIC BLOOD PRESSURE: 80 MMHG | HEIGHT: 72 IN

## 2023-04-24 DIAGNOSIS — M54.16 CHRONIC LUMBAR RADICULOPATHY: ICD-10-CM

## 2023-04-24 DIAGNOSIS — F41.8 DEPRESSION WITH ANXIETY: ICD-10-CM

## 2023-04-24 DIAGNOSIS — G60.9 IDIOPATHIC PERIPHERAL NEUROPATHY: ICD-10-CM

## 2023-04-24 DIAGNOSIS — I10 PRIMARY HYPERTENSION: ICD-10-CM

## 2023-04-24 DIAGNOSIS — R27.8 SENSORY ATAXIA: ICD-10-CM

## 2023-04-24 DIAGNOSIS — G62.1 ALCOHOLIC PERIPHERAL NEUROPATHY (HCC): ICD-10-CM

## 2023-04-24 DIAGNOSIS — M79.2 NEUROPATHIC PAIN: Primary | ICD-10-CM

## 2023-04-24 DIAGNOSIS — Z91.81 H/O FALL: ICD-10-CM

## 2023-04-24 DIAGNOSIS — G47.33 OSA (OBSTRUCTIVE SLEEP APNEA): ICD-10-CM

## 2023-04-24 DIAGNOSIS — F17.200 SMOKER: ICD-10-CM

## 2023-04-24 PROCEDURE — 3079F DIAST BP 80-89 MM HG: CPT | Performed by: PSYCHIATRY & NEUROLOGY

## 2023-04-24 PROCEDURE — 3074F SYST BP LT 130 MM HG: CPT | Performed by: PSYCHIATRY & NEUROLOGY

## 2023-04-24 PROCEDURE — 99214 OFFICE O/P EST MOD 30 MIN: CPT | Performed by: PSYCHIATRY & NEUROLOGY

## 2023-04-24 RX ORDER — GABAPENTIN 300 MG/1
CAPSULE ORAL
Qty: 90 CAPSULE | Refills: 2 | Status: SHIPPED | OUTPATIENT
Start: 2023-04-24 | End: 2023-06-22

## 2023-04-24 RX ORDER — LORAZEPAM 1 MG/1
TABLET ORAL
COMMUNITY
Start: 2023-03-20

## 2023-04-24 RX ORDER — OXCARBAZEPINE 150 MG/1
TABLET, FILM COATED ORAL
Qty: 120 TABLET | Refills: 2 | Status: SHIPPED | OUTPATIENT
Start: 2023-04-24

## 2023-04-24 ASSESSMENT — ENCOUNTER SYMPTOMS
VOICE CHANGE: 0
CHOKING: 0
APNEA: 0
BACK PAIN: 1
WHEEZING: 0
SHORTNESS OF BREATH: 0
FACIAL SWELLING: 0
TROUBLE SWALLOWING: 0
SINUS PRESSURE: 0
SORE THROAT: 0
COUGH: 0
CHEST TIGHTNESS: 0

## 2023-04-24 NOTE — PROGRESS NOTES
[facility]  Neurology    [address] [address]  BRERM:[phone]   Fax: [phone]        SUBJECTIVE:       PATIENT ID:  Chucky Mojica is a  RIGHT    HANDED 63 y.o. male. Neurologic Problem  The patient's primary symptoms include clumsiness, focal sensory loss, a loss of balance, memory loss and weakness. Primary symptoms comment: PERIPHERAL  POLYNEUROPATHY  AND  CHRONIC   LUMBAR  PAIN    . This is a chronic problem. The neurological problem developed insidiously. The problem is unchanged. Associated symptoms include back pain. Pertinent negatives include no shortness of breath. Past treatments include bed rest and sleep. The treatment provided no relief. There is no history of a bleeding disorder, a clotting disorder, a CVA, dementia, head trauma, liver disease or seizures. History obtained from  The   PATIENT         and other  available   medical  records   were  Also  reviewed. The  Duration,  Quality,  Severity,  Location,  Timing,  Context,  Modifying  Factors   Of   The   Chief   Complaint       And  Present  Illness  Was   Reviewed   In   Chronological   Manner. PATIENT'S  MAIN  CONCERNS INCLUDE :                     1)     H/O   CHRONIC  PERIPHERAL  POLYNEUROPATHY                                     BOTH LOWER  EXTREMITIES    FOR    5   YEARS                        2)      NO    H/O    DM. 3)      PREVIOUS      H/O      ALCOHOL    ABUSE                                     QUIT  ALCOHOL   FOR   3   YEARS                      4)    H/O     CHRONIC  SMOKING                          PATIENT  AWARE  OF  RISKS  AND                      SIDE  EFFECTS  OF   SMOKING   DISCUSSED. PATIENT   ADVISED   AND  COUNSELED    TO   QUIT  SMOKING.                      5)      H/O   CHRONIC  ANXIETY,  DEPRESSION                                              FOR    8

## 2023-05-31 RX ORDER — GABAPENTIN 300 MG/1
CAPSULE ORAL
Qty: 90 CAPSULE | Refills: 2 | Status: SHIPPED | OUTPATIENT
Start: 2023-05-31 | End: 2023-07-29

## 2023-05-31 RX ORDER — MELOXICAM 15 MG/1
15 TABLET ORAL DAILY
Qty: 30 TABLET | Refills: 1 | Status: SHIPPED | OUTPATIENT
Start: 2023-05-31

## 2023-07-08 ENCOUNTER — HOSPITAL ENCOUNTER (OUTPATIENT)
Dept: GENERAL RADIOLOGY | Age: 63
End: 2023-07-08
Payer: COMMERCIAL

## 2023-07-08 ENCOUNTER — OFFICE VISIT (OUTPATIENT)
Dept: PRIMARY CARE CLINIC | Age: 63
End: 2023-07-08
Payer: COMMERCIAL

## 2023-07-08 VITALS
SYSTOLIC BLOOD PRESSURE: 138 MMHG | HEART RATE: 58 BPM | DIASTOLIC BLOOD PRESSURE: 86 MMHG | RESPIRATION RATE: 16 BRPM | OXYGEN SATURATION: 100 % | WEIGHT: 223 LBS | TEMPERATURE: 97.7 F | BODY MASS INDEX: 30.2 KG/M2 | HEIGHT: 72 IN

## 2023-07-08 DIAGNOSIS — S69.90XA FISH HOOK IN FINGER: ICD-10-CM

## 2023-07-08 DIAGNOSIS — M25.511 ACUTE PAIN OF RIGHT SHOULDER: ICD-10-CM

## 2023-07-08 DIAGNOSIS — S69.90XA FISH HOOK IN FINGER: Primary | ICD-10-CM

## 2023-07-08 PROCEDURE — 73140 X-RAY EXAM OF FINGER(S): CPT

## 2023-07-08 PROCEDURE — 73030 X-RAY EXAM OF SHOULDER: CPT

## 2023-07-08 RX ORDER — BACITRACIN ZINC 500 [USP'U]/G
OINTMENT TOPICAL ONCE
Status: COMPLETED | OUTPATIENT
Start: 2023-07-08 | End: 2023-07-08

## 2023-07-08 RX ORDER — DOXYCYCLINE HYCLATE 100 MG
100 TABLET ORAL 2 TIMES DAILY
Qty: 14 TABLET | Refills: 0 | Status: SHIPPED | OUTPATIENT
Start: 2023-07-08 | End: 2023-07-15

## 2023-07-08 RX ADMIN — BACITRACIN ZINC: 500 OINTMENT TOPICAL at 10:42

## 2023-07-08 SDOH — ECONOMIC STABILITY: FOOD INSECURITY: WITHIN THE PAST 12 MONTHS, THE FOOD YOU BOUGHT JUST DIDN'T LAST AND YOU DIDN'T HAVE MONEY TO GET MORE.: NEVER TRUE

## 2023-07-08 SDOH — ECONOMIC STABILITY: FOOD INSECURITY: WITHIN THE PAST 12 MONTHS, YOU WORRIED THAT YOUR FOOD WOULD RUN OUT BEFORE YOU GOT MONEY TO BUY MORE.: NEVER TRUE

## 2023-07-08 SDOH — ECONOMIC STABILITY: HOUSING INSECURITY
IN THE LAST 12 MONTHS, WAS THERE A TIME WHEN YOU DID NOT HAVE A STEADY PLACE TO SLEEP OR SLEPT IN A SHELTER (INCLUDING NOW)?: NO

## 2023-07-08 SDOH — ECONOMIC STABILITY: INCOME INSECURITY: HOW HARD IS IT FOR YOU TO PAY FOR THE VERY BASICS LIKE FOOD, HOUSING, MEDICAL CARE, AND HEATING?: NOT HARD AT ALL

## 2023-07-08 ASSESSMENT — ENCOUNTER SYMPTOMS
BLOOD IN STOOL: 0
SHORTNESS OF BREATH: 0
TROUBLE SWALLOWING: 0
DIARRHEA: 0
ABDOMINAL PAIN: 0
EYE PAIN: 0
CONSTIPATION: 0
COUGH: 0
VOMITING: 0
NAUSEA: 0

## 2023-07-08 ASSESSMENT — PATIENT HEALTH QUESTIONNAIRE - PHQ9
5. POOR APPETITE OR OVEREATING: 0
SUM OF ALL RESPONSES TO PHQ QUESTIONS 1-9: 9
6. FEELING BAD ABOUT YOURSELF - OR THAT YOU ARE A FAILURE OR HAVE LET YOURSELF OR YOUR FAMILY DOWN: 3
SUM OF ALL RESPONSES TO PHQ QUESTIONS 1-9: 9
SUM OF ALL RESPONSES TO PHQ QUESTIONS 1-9: 9
4. FEELING TIRED OR HAVING LITTLE ENERGY: 0
SUM OF ALL RESPONSES TO PHQ9 QUESTIONS 1 & 2: 6
10. IF YOU CHECKED OFF ANY PROBLEMS, HOW DIFFICULT HAVE THESE PROBLEMS MADE IT FOR YOU TO DO YOUR WORK, TAKE CARE OF THINGS AT HOME, OR GET ALONG WITH OTHER PEOPLE: 1
3. TROUBLE FALLING OR STAYING ASLEEP: 0
8. MOVING OR SPEAKING SO SLOWLY THAT OTHER PEOPLE COULD HAVE NOTICED. OR THE OPPOSITE, BEING SO FIGETY OR RESTLESS THAT YOU HAVE BEEN MOVING AROUND A LOT MORE THAN USUAL: 0
SUM OF ALL RESPONSES TO PHQ QUESTIONS 1-9: 9
2. FEELING DOWN, DEPRESSED OR HOPELESS: 3
7. TROUBLE CONCENTRATING ON THINGS, SUCH AS READING THE NEWSPAPER OR WATCHING TELEVISION: 0
1. LITTLE INTEREST OR PLEASURE IN DOING THINGS: 3
9. THOUGHTS THAT YOU WOULD BE BETTER OFF DEAD, OR OF HURTING YOURSELF: 0

## 2023-07-08 NOTE — PROGRESS NOTES
Fish hook removed by Dr. Ollie Chatterjee successfully, wound soaked in saline and ruben hex 4 for 5 min then cleaned again and dressed with bacitracin and non stick dressing.

## 2023-07-08 NOTE — PROGRESS NOTES
[facility]             [address] [address] [facility]                        Telephone [phone]             Fax [phone]       Gabby Falcon  :  1960  Age:  63 y.o. MRN:  [id number]  Date of visit:  2023     Assessment and Plan:    1. Fish hook in finger  Embedded fish hook in the right thumb. Digital block performed on the right thumb with 1% lidocaine. Additional lidocaine was injected around the distal thumb due to conitinued pain. A total of 7cc of lidocaine was used. Fish hook was manipulated through the skin exposing the barbed end of the fish hook. At that time the fish hook was cut into two pieces using wire cutters. The remaining embedded fish hook was then removed. The patient tolerated the procedure well without any complications. The wound was then cleaned and dressed with triple antibiotic ointment. Due to the proximity to the nail we will order and XR to evaluate deep tissues. Will also prescribe doxycycline 100mg BID for 7 days. Follow up wound care instructions provided to the patient. - XR FINGER RIGHT (MIN 2 VIEWS); Future  - MT INJECTION AA&/STRD OTHER PERIPHERAL NERVE/BRANCH  - 72960 - MT REMOVE FOREIGN BODY SIMPLE  - doxycycline hyclate (VIBRA-TABS) 100 MG tablet; Take 1 tablet by mouth 2 times daily for 7 days  Dispense: 14 tablet; Refill: 0    2. Acute pain of right shoulder  Ongoing for 1 week s/p fall. Will evaluate with XR. Recommend continuing Mobic for pain. - XR SHOULDER RIGHT (MIN 2 VIEWS); Future      Subjective:    Gabby Falcon is a 63 y.o. male who presents to [facility] today (2023) for evaluation of:  Hand Injury (Pt with large fish hook stuck in right 1st finger since 0700. )    Patient is a 12year-old male who presents to the office today for evaluation of a fishhook stuck in his right thumb. Symptoms began around 7 AM this morning.   States that he was

## 2023-07-10 ENCOUNTER — HOSPITAL ENCOUNTER (OUTPATIENT)
Dept: MRI IMAGING | Age: 63
Discharge: HOME | End: 2023-07-12
Payer: COMMERCIAL

## 2023-07-10 DIAGNOSIS — S46.212A RUPTURE OF PROXIMAL BICEPS TENDON, LEFT, INITIAL ENCOUNTER: ICD-10-CM

## 2023-07-10 PROCEDURE — 73221 MRI JOINT UPR EXTREM W/O DYE: CPT

## 2023-07-20 RX ORDER — GABAPENTIN 300 MG/1
CAPSULE ORAL
Qty: 90 CAPSULE | Refills: 0 | Status: SHIPPED | OUTPATIENT
Start: 2023-07-20 | End: 2023-08-14 | Stop reason: SDUPTHER

## 2023-07-20 NOTE — TELEPHONE ENCOUNTER
Last Appt:  4/24/2023  Next Appt:   8/14/2023  Med verified in Epic     Patient needs refill on Gabapentin

## 2023-07-24 RX ORDER — MELOXICAM 15 MG/1
TABLET ORAL
Qty: 30 TABLET | Refills: 0 | Status: SHIPPED | OUTPATIENT
Start: 2023-07-24

## 2023-08-08 ENCOUNTER — OFFICE VISIT (OUTPATIENT)
Dept: ORTHOPEDIC SURGERY | Age: 63
End: 2023-08-08
Payer: COMMERCIAL

## 2023-08-08 VITALS
SYSTOLIC BLOOD PRESSURE: 136 MMHG | BODY MASS INDEX: 30.2 KG/M2 | HEART RATE: 77 BPM | OXYGEN SATURATION: 96 % | WEIGHT: 223 LBS | DIASTOLIC BLOOD PRESSURE: 78 MMHG | HEIGHT: 72 IN

## 2023-08-08 DIAGNOSIS — S46.211A RUPTURE OF RIGHT PROXIMAL BICEPS TENDON, INITIAL ENCOUNTER: Primary | ICD-10-CM

## 2023-08-08 DIAGNOSIS — S46.011A TRAUMATIC INCOMPLETE TEAR OF RIGHT ROTATOR CUFF, INITIAL ENCOUNTER: ICD-10-CM

## 2023-08-08 PROCEDURE — 3075F SYST BP GE 130 - 139MM HG: CPT | Performed by: PHYSICIAN ASSISTANT

## 2023-08-08 PROCEDURE — 3078F DIAST BP <80 MM HG: CPT | Performed by: PHYSICIAN ASSISTANT

## 2023-08-08 PROCEDURE — 99213 OFFICE O/P EST LOW 20 MIN: CPT | Performed by: PHYSICIAN ASSISTANT

## 2023-08-08 PROCEDURE — 99203 OFFICE O/P NEW LOW 30 MIN: CPT | Performed by: PHYSICIAN ASSISTANT

## 2023-08-08 NOTE — PROGRESS NOTES
with rupture of proximal biceps tendon. FINDINGS: Exam is limited due to patient motion. ROTATOR CUFF: No significant fluid in the subacromial subdeltoid bursa. Low-grade partial-thickness articular-surface and interstitial tearing of mid and posterior supraspinatus between critical zone and footplate. Mild underlying supraspinatus and infraspinatus tendinosis. Mild subscapularis tendinosis. Teres minor muscle/tendon appears grossly intact without evidence of tearing. No significant atrophy or fatty degeneration of the visualized rotator cuff musculature. BICEPS TENDON: A normal long head of the biceps tendon not identified in the bicipital groove or rotator interval.  This is likely related to rupture. LABRUM: Degenerative volume loss/tearing of the superior and posterosuperior labrum. GLENOHUMERAL JOINT: Mild glenohumeral chondromalacia. Inferior glenohumeral ligament appears intact. No sizable glenohumeral joint effusion. Mild osteophyte spurring at the margins of the glenohumeral joint. AC JOINT AND ACROMIOCLAVICULAR ARCH: Mild degenerative change of the left AC joint. Type 2 acromion. BONE MARROW: No acute fracture or dislocation. Bone marrow signal intensity grossly unremarkable. OUTLET SPACES: Suprascapular notch and quadrilateral space grossly unremarkable in appearance. No left axillary lymphadenopathy. 1. Please note exam is limited due to patient motion. Low-grade partial-thickness articular surface and interstitial tearing of mid and posterior supraspinatus between critical zone and footplate. Mild underlying supraspinatus and infraspinatus tendinosis. 2. Mild subscapularis tendinosis. 3. Absence of the long head of the biceps tendon from the rotator interval and bicipital groove likely related to age-indeterminate complete tear. 4. Mild glenohumeral osteoarthrosis. Mild glenohumeral chondromalacia. 5. Degenerative volume loss and tearing of the superior and posterosuperior labrum.  6. Mild

## 2023-08-14 ENCOUNTER — OFFICE VISIT (OUTPATIENT)
Dept: NEUROLOGY | Age: 63
End: 2023-08-14
Payer: COMMERCIAL

## 2023-08-14 VITALS
DIASTOLIC BLOOD PRESSURE: 82 MMHG | SYSTOLIC BLOOD PRESSURE: 134 MMHG | OXYGEN SATURATION: 96 % | BODY MASS INDEX: 29.42 KG/M2 | WEIGHT: 217.2 LBS | HEIGHT: 72 IN | HEART RATE: 70 BPM

## 2023-08-14 DIAGNOSIS — I10 PRIMARY HYPERTENSION: ICD-10-CM

## 2023-08-14 DIAGNOSIS — R20.0 NUMBNESS AND TINGLING OF BOTH LEGS BELOW KNEES: ICD-10-CM

## 2023-08-14 DIAGNOSIS — F17.200 SMOKER: ICD-10-CM

## 2023-08-14 DIAGNOSIS — M79.2 NEUROPATHIC PAIN: Primary | ICD-10-CM

## 2023-08-14 DIAGNOSIS — Z91.81 H/O FALL: ICD-10-CM

## 2023-08-14 DIAGNOSIS — R27.8 SENSORY ATAXIA: ICD-10-CM

## 2023-08-14 DIAGNOSIS — R20.2 NUMBNESS AND TINGLING OF BOTH LEGS BELOW KNEES: ICD-10-CM

## 2023-08-14 DIAGNOSIS — G60.9 IDIOPATHIC PERIPHERAL NEUROPATHY: ICD-10-CM

## 2023-08-14 DIAGNOSIS — M51.36 LUMBAR DEGENERATIVE DISC DISEASE: ICD-10-CM

## 2023-08-14 DIAGNOSIS — F41.8 DEPRESSION WITH ANXIETY: ICD-10-CM

## 2023-08-14 DIAGNOSIS — G62.1 ALCOHOLIC PERIPHERAL NEUROPATHY (HCC): ICD-10-CM

## 2023-08-14 DIAGNOSIS — M54.16 CHRONIC LUMBAR RADICULOPATHY: ICD-10-CM

## 2023-08-14 PROCEDURE — 3079F DIAST BP 80-89 MM HG: CPT | Performed by: PSYCHIATRY & NEUROLOGY

## 2023-08-14 PROCEDURE — 3075F SYST BP GE 130 - 139MM HG: CPT | Performed by: PSYCHIATRY & NEUROLOGY

## 2023-08-14 PROCEDURE — 99214 OFFICE O/P EST MOD 30 MIN: CPT | Performed by: PSYCHIATRY & NEUROLOGY

## 2023-08-14 RX ORDER — GABAPENTIN 300 MG/1
CAPSULE ORAL
Qty: 90 CAPSULE | Refills: 2 | Status: SHIPPED | OUTPATIENT
Start: 2023-08-14 | End: 2023-10-12

## 2023-08-14 RX ORDER — OXCARBAZEPINE 150 MG/1
TABLET, FILM COATED ORAL
Qty: 120 TABLET | Refills: 2 | Status: SHIPPED | OUTPATIENT
Start: 2023-08-14

## 2023-08-14 ASSESSMENT — ENCOUNTER SYMPTOMS
SINUS PRESSURE: 0
COUGH: 0
CHEST TIGHTNESS: 0
VOICE CHANGE: 0
SHORTNESS OF BREATH: 0
WHEEZING: 0
BACK PAIN: 1
TROUBLE SWALLOWING: 0
FACIAL SWELLING: 0
CHOKING: 0
APNEA: 0
SORE THROAT: 0

## 2023-08-14 NOTE — PROGRESS NOTES
[facility]  Neurology    [address], [place]  DJPWE:[phone]   Fax: [phone]        SUBJECTIVE:       PATIENT ID:  Pam Truong is a  RIGHT    HANDED 63 y.o. male. Neurologic Problem  The patient's primary symptoms include clumsiness, focal sensory loss, a loss of balance, memory loss and weakness. Primary symptoms comment: PERIPHERAL  POLYNEUROPATHY  AND  CHRONIC   LUMBAR  PAIN    . This is a chronic problem. The neurological problem developed insidiously. The problem is unchanged. Associated symptoms include back pain. Pertinent negatives include no shortness of breath. Past treatments include bed rest and sleep. The treatment provided no relief. There is no history of a bleeding disorder, a clotting disorder, a CVA, dementia, head trauma, liver disease or seizures. Neuropathy              History obtained from  The   PATIENT         and other  available   medical  records   were  Also  reviewed. The  Duration,  Quality,  Severity,  Location,  Timing,  Context,  Modifying  Factors   Of   The   Chief   Complaint       And  Present  Illness  Was   Reviewed   In   Chronological   Manner. PATIENT'S  MAIN  CONCERNS INCLUDE :                     1)     H/O   CHRONIC  PERIPHERAL  POLYNEUROPATHY                                     BOTH LOWER  EXTREMITIES    FOR    5   YEARS                        2)      NO    H/O    DM. 3)      PREVIOUS      H/O      ALCOHOL    ABUSE                                     QUIT  ALCOHOL   FOR   3   YEARS                      4)    H/O     CHRONIC  SMOKING                          PATIENT  AWARE  OF  RISKS  AND                      SIDE  EFFECTS  OF   SMOKING   DISCUSSED. PATIENT   ADVISED   AND  COUNSELED    TO   QUIT  SMOKING.                      5)      H/O   CHRONIC  ANXIETY,  DEPRESSION

## 2023-08-21 RX ORDER — MELOXICAM 15 MG/1
TABLET ORAL
Qty: 30 TABLET | Refills: 2 | Status: SHIPPED | OUTPATIENT
Start: 2023-08-21

## 2023-08-23 ENCOUNTER — HOSPITAL ENCOUNTER (OUTPATIENT)
Dept: GENERAL RADIOLOGY | Age: 63
Discharge: HOME | End: 2023-08-25
Payer: COMMERCIAL

## 2023-08-23 ENCOUNTER — OFFICE VISIT (OUTPATIENT)
Dept: ORTHOPEDIC SURGERY | Age: 63
End: 2023-08-23
Payer: COMMERCIAL

## 2023-08-23 VITALS — BODY MASS INDEX: 29.39 KG/M2 | HEIGHT: 72 IN | WEIGHT: 217 LBS

## 2023-08-23 DIAGNOSIS — S46.212A RUPTURE OF LEFT PROXIMAL BICEPS TENDON, INITIAL ENCOUNTER: Primary | ICD-10-CM

## 2023-08-23 DIAGNOSIS — S46.012A TRAUMATIC COMPLETE TEAR OF LEFT ROTATOR CUFF, INITIAL ENCOUNTER: ICD-10-CM

## 2023-08-23 DIAGNOSIS — S46.212A RUPTURE OF LEFT PROXIMAL BICEPS TENDON, INITIAL ENCOUNTER: ICD-10-CM

## 2023-08-23 PROCEDURE — 99213 OFFICE O/P EST LOW 20 MIN: CPT | Performed by: STUDENT IN AN ORGANIZED HEALTH CARE EDUCATION/TRAINING PROGRAM

## 2023-08-23 PROCEDURE — 99203 OFFICE O/P NEW LOW 30 MIN: CPT | Performed by: STUDENT IN AN ORGANIZED HEALTH CARE EDUCATION/TRAINING PROGRAM

## 2023-08-23 PROCEDURE — 73030 X-RAY EXAM OF SHOULDER: CPT

## 2023-08-23 NOTE — PROGRESS NOTES
[facility] [address]  [address] [place] [address]  Dept: [phone]    Ambulatory Orthopedic Consult    CHIEF COMPLAINT:    Chief Complaint   Patient presents with    Shoulder Pain     BWC/ LEFT SHOULDER/ BICEP       HISTORY OF PRESENT ILLNESS:      The patient is a 63 y.o. RHD male who is being seen for consultation and evaluation of shoulder pain. Patient reports that on June 8 he was working at the [employer] when he was lowering a steel beam and felt a pop in his left shoulder. He states that he had immediate pain following this. He reports that he did have an MRI completed back in July which demonstrated a biceps tendon rupture. He states that over the past few months his shoulder pain has improved. 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 He states that he is continue to work with minimal limitation. He does report that he has some weakness compared to prior to the injury. He reports that at baseline he had some shoulder pain prior to this which has not changed. Denies any numbness or tingling. Past Medical History:    Past Medical History:   Diagnosis Date    Adult situational stress disorder     Anger     Issues. Depression with anxiety     History of tobacco abuse     Hyperlipidemia     Hypertension     YESICA (obstructive sleep apnea)     cpap noncompliace    Osteoarthritis     back and knees    Suicide attempt [facility]) Age 26       Past Surgical History:    Past Surgical History:   Procedure Laterality Date    COLONOSCOPY  03/21/2013    With EGD with biopsies and polypectomies showing severe esophagitis with ulceration, gastritis with antral erosions, hiatal hernia and small rectal polyp.     EYE SURGERY Left     INGUINAL HERNIA REPAIR  06/16/2021    lap left ing [id number]    KNEE ARTHROSCOPY Left     Meniscus repair, [facility]

## 2023-09-05 RX ORDER — GABAPENTIN 300 MG/1
CAPSULE ORAL
Qty: 90 CAPSULE | Refills: 2 | Status: SHIPPED | OUTPATIENT
Start: 2023-09-05 | End: 2023-11-03

## 2023-11-14 RX ORDER — OXCARBAZEPINE 150 MG/1
TABLET, FILM COATED ORAL
Qty: 120 TABLET | Refills: 2 | Status: SHIPPED | OUTPATIENT
Start: 2023-11-14

## 2023-11-15 ENCOUNTER — OFFICE VISIT (OUTPATIENT)
Dept: NEUROLOGY | Age: 63
End: 2023-11-15
Payer: COMMERCIAL

## 2023-11-15 VITALS
DIASTOLIC BLOOD PRESSURE: 78 MMHG | SYSTOLIC BLOOD PRESSURE: 124 MMHG | HEART RATE: 68 BPM | WEIGHT: 224 LBS | BODY MASS INDEX: 30.38 KG/M2 | OXYGEN SATURATION: 97 %

## 2023-11-15 DIAGNOSIS — M51.36 LUMBAR DEGENERATIVE DISC DISEASE: ICD-10-CM

## 2023-11-15 DIAGNOSIS — R20.0 NUMBNESS AND TINGLING OF BOTH LEGS BELOW KNEES: ICD-10-CM

## 2023-11-15 DIAGNOSIS — R27.8 SENSORY ATAXIA: ICD-10-CM

## 2023-11-15 DIAGNOSIS — G62.1 ALCOHOLIC PERIPHERAL NEUROPATHY (HCC): ICD-10-CM

## 2023-11-15 DIAGNOSIS — Z91.81 H/O FALL: ICD-10-CM

## 2023-11-15 DIAGNOSIS — E78.2 MIXED HYPERLIPIDEMIA: ICD-10-CM

## 2023-11-15 DIAGNOSIS — I10 PRIMARY HYPERTENSION: ICD-10-CM

## 2023-11-15 DIAGNOSIS — M79.2 NEUROPATHIC PAIN: Primary | ICD-10-CM

## 2023-11-15 DIAGNOSIS — J44.9 CHRONIC OBSTRUCTIVE PULMONARY DISEASE, UNSPECIFIED COPD TYPE (HCC): ICD-10-CM

## 2023-11-15 DIAGNOSIS — F41.8 DEPRESSION WITH ANXIETY: ICD-10-CM

## 2023-11-15 DIAGNOSIS — M54.16 CHRONIC LUMBAR RADICULOPATHY: ICD-10-CM

## 2023-11-15 DIAGNOSIS — R20.2 NUMBNESS AND TINGLING OF BOTH LEGS BELOW KNEES: ICD-10-CM

## 2023-11-15 DIAGNOSIS — G60.9 IDIOPATHIC PERIPHERAL NEUROPATHY: ICD-10-CM

## 2023-11-15 DIAGNOSIS — F17.200 SMOKER: ICD-10-CM

## 2023-11-15 PROCEDURE — 99214 OFFICE O/P EST MOD 30 MIN: CPT | Performed by: PSYCHIATRY & NEUROLOGY

## 2023-11-15 PROCEDURE — 3074F SYST BP LT 130 MM HG: CPT | Performed by: PSYCHIATRY & NEUROLOGY

## 2023-11-15 PROCEDURE — 3078F DIAST BP <80 MM HG: CPT | Performed by: PSYCHIATRY & NEUROLOGY

## 2023-11-15 RX ORDER — GABAPENTIN 300 MG/1
CAPSULE ORAL
Qty: 90 CAPSULE | Refills: 2 | Status: SHIPPED | OUTPATIENT
Start: 2023-11-15 | End: 2024-01-25

## 2023-11-15 RX ORDER — MELOXICAM 15 MG/1
15 TABLET ORAL DAILY
Qty: 30 TABLET | Refills: 2 | Status: SHIPPED | OUTPATIENT
Start: 2023-11-15

## 2023-11-15 ASSESSMENT — ENCOUNTER SYMPTOMS
COUGH: 0
FACIAL SWELLING: 0
CHOKING: 0
TROUBLE SWALLOWING: 0
APNEA: 0
VOICE CHANGE: 0
BACK PAIN: 1
SINUS PRESSURE: 0
WHEEZING: 0
CHEST TIGHTNESS: 0
SORE THROAT: 0
SHORTNESS OF BREATH: 0

## 2023-11-15 NOTE — PROGRESS NOTES
Also   Additional   Symptoms   Present    As  Documented    In   The   detailed                  Review  Of  Systems   And    Please   Refer   To    Them for   Additional    Information. Any components  That are either  Unobtainable  Or  Limited  In   HPI, ROS  And/or PFSH   Are                   Due   ToPatient's  Medical  Problems,  Clinical  Condition   and/or lack of                                 other    Alternate   resources. RECORDS   REVIEWED:    historical medical records           INFORMATION   REVIEWED:     MEDICAL   HISTORY,SURGICAL   HISTORY,     MEDICATIONS   LIST,   ALLERGIES AND  DRUG  INTOLERANCES,       FAMILY   HISTORY,  SOCIAL  HISTORY,      PROBLEM  LIST   FOR  PATIENT  CARE   COORDINATION          Past Medical History:   Diagnosis Date    Adult situational stress disorder     Anger     Issues. Depression with anxiety     History of tobacco abuse     Hyperlipidemia     Hypertension     YESICA (obstructive sleep apnea)     cpap noncompliace    Osteoarthritis     back and knees    Suicide attempt [facility]) Age 26         Past Surgical History:   Procedure Laterality Date    COLONOSCOPY  03/21/2013    With EGD with biopsies and polypectomies showing severe esophagitis with ulceration, gastritis with antral erosions, hiatal hernia and small rectal polyp. EYE SURGERY Left     INGUINAL HERNIA REPAIR  06/16/2021    lap left ing [id number]    KNEE ARTHROSCOPY Left     Meniscus repair, [address] [place] [address], Dr. Kimi Bautista. MEATOTOMY      For meatal stenosis.     UVULECTOMY      WISDOM TOOTH EXTRACTION           Current Outpatient Medications   Medication Sig Dispense Refill    OXcarbazepine (TRILEPTAL) 150 MG tablet TWO    TABLETS  TWICE  DAILY 120 tablet 2    gabapentin (NEURONTIN) 300 MG capsule One    capsule   in    the   evening    and  bed   time    as  needed 90 capsule 2    meloxicam (MOBIC) 15 MG tablet Take 1 tablet by mouth once daily 30

## 2023-12-06 RX ORDER — GABAPENTIN 300 MG/1
CAPSULE ORAL
Qty: 90 CAPSULE | Refills: 2 | Status: SHIPPED | OUTPATIENT
Start: 2023-12-06 | End: 2024-02-15

## 2023-12-06 NOTE — TELEPHONE ENCOUNTER
Last Appt:  11/15/2023  Next Appt:   2/14/2024  Med verified in Epic       PATIENT REQUESTING REFILL FOR GABAPENTIN     USES [facility]     OAR[facility] COMPLETED 12/6/23

## 2024-02-14 ENCOUNTER — OFFICE VISIT (OUTPATIENT)
Dept: NEUROLOGY | Age: 64
End: 2024-02-14
Payer: COMMERCIAL

## 2024-02-14 VITALS
SYSTOLIC BLOOD PRESSURE: 122 MMHG | OXYGEN SATURATION: 97 % | DIASTOLIC BLOOD PRESSURE: 66 MMHG | RESPIRATION RATE: 16 BRPM | HEART RATE: 66 BPM | WEIGHT: 224 LBS | HEIGHT: 72 IN | BODY MASS INDEX: 30.34 KG/M2

## 2024-02-14 DIAGNOSIS — M51.36 LUMBAR DEGENERATIVE DISC DISEASE: ICD-10-CM

## 2024-02-14 DIAGNOSIS — Z91.81 H/O FALL: ICD-10-CM

## 2024-02-14 DIAGNOSIS — M79.2 NEUROPATHIC PAIN: Primary | ICD-10-CM

## 2024-02-14 DIAGNOSIS — R20.0 NUMBNESS AND TINGLING OF BOTH LEGS BELOW KNEES: ICD-10-CM

## 2024-02-14 DIAGNOSIS — F17.200 SMOKER: ICD-10-CM

## 2024-02-14 DIAGNOSIS — F41.8 DEPRESSION WITH ANXIETY: ICD-10-CM

## 2024-02-14 DIAGNOSIS — G62.1 ALCOHOLIC PERIPHERAL NEUROPATHY (HCC): ICD-10-CM

## 2024-02-14 DIAGNOSIS — M54.16 CHRONIC LUMBAR RADICULOPATHY: ICD-10-CM

## 2024-02-14 DIAGNOSIS — G60.9 IDIOPATHIC PERIPHERAL NEUROPATHY: ICD-10-CM

## 2024-02-14 DIAGNOSIS — R27.8 SENSORY ATAXIA: ICD-10-CM

## 2024-02-14 DIAGNOSIS — R20.2 NUMBNESS AND TINGLING OF BOTH LEGS BELOW KNEES: ICD-10-CM

## 2024-02-14 PROCEDURE — 3078F DIAST BP <80 MM HG: CPT | Performed by: PSYCHIATRY & NEUROLOGY

## 2024-02-14 PROCEDURE — 99214 OFFICE O/P EST MOD 30 MIN: CPT | Performed by: PSYCHIATRY & NEUROLOGY

## 2024-02-14 PROCEDURE — 3074F SYST BP LT 130 MM HG: CPT | Performed by: PSYCHIATRY & NEUROLOGY

## 2024-02-14 RX ORDER — OXCARBAZEPINE 150 MG/1
TABLET, FILM COATED ORAL
Qty: 120 TABLET | Refills: 2 | Status: SHIPPED | OUTPATIENT
Start: 2024-02-14

## 2024-02-14 RX ORDER — GABAPENTIN 300 MG/1
CAPSULE ORAL
Qty: 90 CAPSULE | Refills: 2 | Status: SHIPPED | OUTPATIENT
Start: 2024-02-14 | End: 2024-04-25

## 2024-02-14 RX ORDER — MELOXICAM 15 MG/1
15 TABLET ORAL DAILY
Qty: 30 TABLET | Refills: 2 | Status: SHIPPED | OUTPATIENT
Start: 2024-02-14

## 2024-02-14 NOTE — PROGRESS NOTES
[facility]  Neurology    [address] [place] [id number]  Phone:[phone]   Fax: [phone]        SUBJECTIVE:       PATIENT ID:  Rakan Talbot is a  RIGHT    HANDED 63 y.o. male.      Neurologic Problem  The patient's primary symptoms include clumsiness, focal sensory loss, a loss of balance, memory loss and weakness. Primary symptoms comment: PERIPHERAL  POLYNEUROPATHY  AND  CHRONIC   LUMBAR  PAIN    . This is a chronic problem. The neurological problem developed insidiously. The problem is unchanged. Associated symptoms include back pain. Pertinent negatives include no shortness of breath. Past treatments include bed rest and sleep. The treatment provided no relief. There is no history of a bleeding disorder, a clotting disorder, a CVA, dementia, head trauma, liver disease or seizures.             History obtained from  The   PATIENT         and other  available   medical  records   were  Also  reviewed.          The  Duration,  Quality,  Severity,  Location,  Timing,  Context,  Modifying  Factors   Of   The   Chief   Complaint       And  Present  Illness  Was   Reviewed   In   Chronological   [facility].                                                PATIENT'S  MAIN  CONCERNS INCLUDE :                     1)     H/O   CHRONIC  PERIPHERAL  POLYNEUROPATHY                                     BOTH LOWER  EXTREMITIES    FOR    5   YEARS                        2)      NO    H/O    DM.                                 3)      PREVIOUS      H/O      ALCOHOL    ABUSE                                     QUIT  ALCOHOL   FOR   3   YEARS                          4)    H/O     CHRONIC  SMOKING                          PATIENT  AWARE  OF  RISKS  AND                      SIDE  EFFECTS  OF   SMOKING   DISCUSSED.                    PATIENT   ADVISED   AND  COUNSELED    TO   QUIT  SMOKING.                     5)      H/O   CHRONIC  ANXIETY,  DEPRESSION                                              FOR   Orthopnea Lower extremity edema

## 2024-02-20 ENCOUNTER — OFFICE VISIT (OUTPATIENT)
Dept: PODIATRY | Age: 64
End: 2024-02-20
Payer: COMMERCIAL

## 2024-02-20 VITALS
HEART RATE: 76 BPM | WEIGHT: 231 LBS | SYSTOLIC BLOOD PRESSURE: 132 MMHG | DIASTOLIC BLOOD PRESSURE: 70 MMHG | RESPIRATION RATE: 20 BRPM | BODY MASS INDEX: 31.33 KG/M2

## 2024-02-20 DIAGNOSIS — L84 CORNS AND CALLOSITIES: ICD-10-CM

## 2024-02-20 DIAGNOSIS — G62.1 ALCOHOLIC PERIPHERAL NEUROPATHY (HCC): ICD-10-CM

## 2024-02-20 DIAGNOSIS — M79.672 FOOT PAIN, LEFT: Primary | ICD-10-CM

## 2024-02-20 PROCEDURE — 99213 OFFICE O/P EST LOW 20 MIN: CPT | Performed by: PODIATRIST

## 2024-02-20 PROCEDURE — 3078F DIAST BP <80 MM HG: CPT | Performed by: PODIATRIST

## 2024-02-20 PROCEDURE — 3075F SYST BP GE 130 - 139MM HG: CPT | Performed by: PODIATRIST

## 2024-02-20 PROCEDURE — 11055 PARING/CUTG B9 HYPRKER LES 1: CPT | Performed by: PODIATRIST

## 2024-02-20 NOTE — PROGRESS NOTES
Subjective:  Rakan Talbot is a 63 y.o. male who presents to the office today complaining of a painful left foot.  Symptoms began  month(s) ago.  Patient relates pain is Present.  Pain is rated 7 out of 10 and is described as moderate.  Mainly when he steps wrong has directly on the area.  Not too bad walking day-to-day activity and normal ground.  Treatments prior to today's visit include: None.  Currently denies F/C/N/V.     No Known Allergies    Past Medical History:   Diagnosis Date    Adult situational stress disorder     Anger     Issues.    Depression with anxiety     History of tobacco abuse     Hyperlipidemia     Hypertension     YESICA (obstructive sleep apnea)     cpap noncompliace    Osteoarthritis     back and knees    Suicide attempt (HCC) Age 23       Prior to Admission medications    Medication Sig Start Date End Date Taking? Authorizing Provider   gabapentin (NEURONTIN) 300 MG capsule One    capsule   in    the   evening    and  bed   time    as  needed 2/14/24 4/25/24 Yes Jakub Lee MD   meloxicam (MOBIC) 15 MG tablet Take 1 tablet by mouth daily 2/14/24  Yes Jakub Lee MD   OXcarbazepine (TRILEPTAL) 150 MG tablet TWO    TABLETS  TWICE  DAILY 2/14/24  Yes Jakub Lee MD   LORazepam (ATIVAN) 1 MG tablet  3/20/23  Yes Michael Isaac MD   aspirin 81 MG EC tablet Take 1 tablet by mouth   Yes Michael Isaac MD   glucosamine-chondroitin 500-400 MG tablet Take 2 tablets by mouth   Yes Michael Isaac MD   tamsulosin (FLOMAX) 0.4 MG capsule Take 1 capsule by mouth every evening   Yes Michael Isaac MD   hydrochlorothiazide (HYDRODIURIL) 25 MG tablet TAKE ONE TABLET BY MOUTH ONCE DAILY 2/15/15  Yes Shaye Ennis PA   simvastatin (ZOCOR) 40 MG tablet Take 1 tablet by mouth daily. 10/23/14  Yes Shaye Ennis PA   lamoTRIgine (LAMICTAL) 25 MG tablet Take 1 tablet by mouth 2 times daily. 10/23/14  Yes Shaye Ennis PA   omeprazole (PRILOSEC) 40 MG

## 2024-03-12 RX ORDER — OXCARBAZEPINE 150 MG/1
TABLET, FILM COATED ORAL
Qty: 120 TABLET | Refills: 3 | Status: SHIPPED | OUTPATIENT
Start: 2024-03-12

## 2024-03-12 NOTE — TELEPHONE ENCOUNTER
Last Appt:  2/14/2024  Next Appt:   6/14/2024  Med verified in [facility]       Patient requesting a refill for trileptal    [facility]

## 2024-05-23 RX ORDER — MELOXICAM 15 MG/1
15 TABLET ORAL DAILY
Qty: 30 TABLET | Refills: 0 | Status: SHIPPED | OUTPATIENT
Start: 2024-05-23

## 2024-05-23 NOTE — TELEPHONE ENCOUNTER
Last Appt:  2/14/2024  Next Appt:   6/14/2024  Med verified in Epic     Patient is requesting a refill for meloxicam    [facility]

## 2024-05-30 RX ORDER — MELOXICAM 15 MG/1
15 TABLET ORAL DAILY
Qty: 30 TABLET | Refills: 0 | OUTPATIENT
Start: 2024-05-30

## 2024-06-14 ENCOUNTER — OFFICE VISIT (OUTPATIENT)
Dept: NEUROLOGY | Age: 64
End: 2024-06-14

## 2024-06-14 VITALS
BODY MASS INDEX: 31.33 KG/M2 | OXYGEN SATURATION: 97 % | DIASTOLIC BLOOD PRESSURE: 78 MMHG | RESPIRATION RATE: 18 BRPM | HEART RATE: 90 BPM | SYSTOLIC BLOOD PRESSURE: 138 MMHG | WEIGHT: 231 LBS

## 2024-06-14 DIAGNOSIS — F41.8 DEPRESSION WITH ANXIETY: ICD-10-CM

## 2024-06-14 DIAGNOSIS — M79.2 NEUROPATHIC PAIN: Primary | ICD-10-CM

## 2024-06-14 DIAGNOSIS — F17.200 SMOKER: ICD-10-CM

## 2024-06-14 DIAGNOSIS — G62.1 ALCOHOLIC PERIPHERAL NEUROPATHY (HCC): ICD-10-CM

## 2024-06-14 DIAGNOSIS — G60.9 IDIOPATHIC PERIPHERAL NEUROPATHY: ICD-10-CM

## 2024-06-14 DIAGNOSIS — E78.2 MIXED HYPERLIPIDEMIA: ICD-10-CM

## 2024-06-14 DIAGNOSIS — M51.36 LUMBAR DEGENERATIVE DISC DISEASE: ICD-10-CM

## 2024-06-14 DIAGNOSIS — R27.8 SENSORY ATAXIA: ICD-10-CM

## 2024-06-14 DIAGNOSIS — G47.33 OSA (OBSTRUCTIVE SLEEP APNEA): ICD-10-CM

## 2024-06-14 DIAGNOSIS — Z91.81 H/O FALL: ICD-10-CM

## 2024-06-14 DIAGNOSIS — M54.16 CHRONIC LUMBAR RADICULOPATHY: ICD-10-CM

## 2024-06-14 DIAGNOSIS — I10 PRIMARY HYPERTENSION: ICD-10-CM

## 2024-06-14 RX ORDER — OXCARBAZEPINE 150 MG/1
TABLET, FILM COATED ORAL
Qty: 120 TABLET | Refills: 3 | Status: SHIPPED | OUTPATIENT
Start: 2024-06-14

## 2024-06-14 RX ORDER — MELOXICAM 15 MG/1
15 TABLET ORAL DAILY
Qty: 30 TABLET | Refills: 2 | Status: SHIPPED | OUTPATIENT
Start: 2024-06-14

## 2024-06-14 RX ORDER — BUSPIRONE HYDROCHLORIDE 15 MG/1
15 TABLET ORAL 2 TIMES DAILY
COMMUNITY
Start: 2024-06-10

## 2024-06-14 RX ORDER — GABAPENTIN 300 MG/1
CAPSULE ORAL
Qty: 90 CAPSULE | Refills: 2 | Status: SHIPPED | OUTPATIENT
Start: 2024-06-14 | End: 2024-10-13

## 2024-06-14 ASSESSMENT — PATIENT HEALTH QUESTIONNAIRE - PHQ9
SUM OF ALL RESPONSES TO PHQ QUESTIONS 1-9: 0
2. FEELING DOWN, DEPRESSED OR HOPELESS: NOT AT ALL
SUM OF ALL RESPONSES TO PHQ9 QUESTIONS 1 & 2: 0
SUM OF ALL RESPONSES TO PHQ QUESTIONS 1-9: 0
1. LITTLE INTEREST OR PLEASURE IN DOING THINGS: NOT AT ALL

## 2024-06-14 ASSESSMENT — ENCOUNTER SYMPTOMS
VOICE CHANGE: 0
FACIAL SWELLING: 0
CHOKING: 0
TROUBLE SWALLOWING: 0
SORE THROAT: 0
CHEST TIGHTNESS: 0
SINUS PRESSURE: 0
WHEEZING: 0
COUGH: 0
APNEA: 0
SHORTNESS OF BREATH: 0
BACK PAIN: 1

## 2024-06-14 NOTE — PATIENT INSTRUCTIONS
*   ADEQUATE   FLUID  INTAKE   AND  ELECTROLYTE  BALANCE             * KEEP  DAIRY  OF   THE  NEUROLOGICAL  SYMPTOMS          *  TO  MAINTAIN  REGULAR  SLEEP  WAKE  CYCLES.     *   TO  HAVE  ADEQUATE  REST  AND   SLEEP    HOURS.          *    AVOID  USAGE OF   TOBACCO,  EXCESSIVE  ALCOHOL                AND   ILLEGAL   SUBSTANCES,  IF  ANY          *  Maintain   Healthy  Life Style    With   Periodic  Monitoring  Of         Any  Medical  Conditions  Including   Elevated  Blood  Pressure,  Lipid  Profile,       Blood  Sugar levels  And   Heart  Disease.                *   Period   Screening  For  Cancers  Involving  Breast,  Colon,         Lungs  And  Other  Organs  As  Applicable,           In consultation   With  Your  Primary Care Providers.                *  If   There is  Any  Significant  Worsening   Of  Current  Symptoms  And             Or  If    Any additional  New  Neurological  Symptoms  and          Significant  Concerns   Should  Call  911 or  Go  To  Emergency  Department            For  Further  Immediate  Evaluation.

## 2024-06-14 NOTE — PROGRESS NOTES
Mood and Affect: Mood is anxious. Mood is not depressed. Affect is not labile, blunt or inappropriate.         Speech: He is communicative. Speech is not rapid and pressured, delayed, slurred or tangential.         Behavior: Behavior is not agitated, slowed, aggressive, withdrawn, hyperactive or combative. Behavior is cooperative.         Thought Content: Thought content is not paranoid or delusional. Thought content does not include homicidal or suicidal ideation. Thought content does not include homicidal or suicidal plan.         Cognition and Memory: Memory is not impaired. He does not exhibit impaired recent memory or impaired remote memory.         Judgment: Judgment is not impulsive or inappropriate.             NEUROLOGICALEXAMINATION :       A) MENTAL STATUS:                   Alert and  oriented  To time, place  And  Person.                  No Aphasia.  No  Dysarthria.                    Able   To  Follow     SIMPLE    commands   without   Any  Difficulty.                   No right  To left confusion.                  Normal  Speech  And language function.                   Insight and  Judgment ,Fund  Of  Knowledge   within normal limits                Recent  And  Remote memory  within   normal limits                Attention &  Concentration are within   normal limits                                                   B) CRANIAL NERVES :        CN : Visual  Acuity  And  Visual fields  within normal limits               Fundi  Could  Not  Be  Could  Not  Be  Evaluated.           3,4,6 CN : Both  Pupils are   Reactive and  Equal.  Movements  Are  Intact.                             No  Nystagmus.  No  ANTWAN.  No  Afferent  Pupillary  Defect noted.          5 CN :  Normal  Facial sensations and Corneal  Reflexes           7 CN:  Normal  Facial  Symmetry  And  Strength.  No facial  Weakness.           8 CN :  Hearing  Appears within normal limits          9, 10 CN: Normal   spontaneous, reflex   palate

## 2024-09-06 ENCOUNTER — OFFICE VISIT (OUTPATIENT)
Dept: NEUROLOGY | Age: 64
End: 2024-09-06

## 2024-09-06 VITALS
DIASTOLIC BLOOD PRESSURE: 76 MMHG | RESPIRATION RATE: 18 BRPM | BODY MASS INDEX: 29.43 KG/M2 | SYSTOLIC BLOOD PRESSURE: 136 MMHG | WEIGHT: 217 LBS | OXYGEN SATURATION: 96 % | HEART RATE: 68 BPM

## 2024-09-06 DIAGNOSIS — I10 PRIMARY HYPERTENSION: ICD-10-CM

## 2024-09-06 DIAGNOSIS — G60.9 IDIOPATHIC PERIPHERAL NEUROPATHY: ICD-10-CM

## 2024-09-06 DIAGNOSIS — M51.36 LUMBAR DEGENERATIVE DISC DISEASE: ICD-10-CM

## 2024-09-06 DIAGNOSIS — M54.16 CHRONIC LUMBAR RADICULOPATHY: ICD-10-CM

## 2024-09-06 DIAGNOSIS — M79.2 NEUROPATHIC PAIN: Primary | ICD-10-CM

## 2024-09-06 DIAGNOSIS — F41.8 DEPRESSION WITH ANXIETY: ICD-10-CM

## 2024-09-06 DIAGNOSIS — R20.0 NUMBNESS AND TINGLING OF BOTH LEGS BELOW KNEES: ICD-10-CM

## 2024-09-06 DIAGNOSIS — G62.1 ALCOHOLIC PERIPHERAL NEUROPATHY (HCC): ICD-10-CM

## 2024-09-06 DIAGNOSIS — F17.200 SMOKER: ICD-10-CM

## 2024-09-06 DIAGNOSIS — Z91.81 H/O FALL: ICD-10-CM

## 2024-09-06 DIAGNOSIS — R20.2 NUMBNESS AND TINGLING OF BOTH LEGS BELOW KNEES: ICD-10-CM

## 2024-09-06 RX ORDER — LAMOTRIGINE 25 MG/1
25 TABLET ORAL 2 TIMES DAILY
Qty: 180 TABLET | Refills: 1 | Status: CANCELLED | OUTPATIENT
Start: 2024-09-06

## 2024-09-06 RX ORDER — GABAPENTIN 300 MG/1
CAPSULE ORAL
Qty: 90 CAPSULE | Refills: 2 | Status: SHIPPED | OUTPATIENT
Start: 2024-09-06 | End: 2025-01-05

## 2024-09-06 RX ORDER — OXCARBAZEPINE 150 MG/1
TABLET, FILM COATED ORAL
Qty: 120 TABLET | Refills: 3 | Status: SHIPPED | OUTPATIENT
Start: 2024-09-06

## 2024-09-06 ASSESSMENT — PATIENT HEALTH QUESTIONNAIRE - PHQ9
SUM OF ALL RESPONSES TO PHQ QUESTIONS 1-9: 0
SUM OF ALL RESPONSES TO PHQ9 QUESTIONS 1 & 2: 0
SUM OF ALL RESPONSES TO PHQ QUESTIONS 1-9: 0
SUM OF ALL RESPONSES TO PHQ QUESTIONS 1-9: 0
1. LITTLE INTEREST OR PLEASURE IN DOING THINGS: NOT AT ALL
SUM OF ALL RESPONSES TO PHQ QUESTIONS 1-9: 0
2. FEELING DOWN, DEPRESSED OR HOPELESS: NOT AT ALL

## 2024-09-06 ASSESSMENT — ENCOUNTER SYMPTOMS
COUGH: 0
SHORTNESS OF BREATH: 0
FACIAL SWELLING: 0
SORE THROAT: 0
WHEEZING: 0
CHEST TIGHTNESS: 0
SINUS PRESSURE: 0
VOICE CHANGE: 0
BACK PAIN: 1
TROUBLE SWALLOWING: 0
APNEA: 0
CHOKING: 0

## 2024-09-06 NOTE — PROGRESS NOTES
[facility]  Neurology    [address] [place] [id number]  Phone:[phone]   Fax: [phone]        SUBJECTIVE:       PATIENT ID:  Rakan Talbot is a  RIGHT    HANDED 64 y.o. male.      Neurologic Problem  The patient's primary symptoms include clumsiness, focal sensory loss, a loss of balance, memory loss and weakness. Primary symptoms comment: PERIPHERAL  POLYNEUROPATHY  AND  CHRONIC   LUMBAR  PAIN    . This is a chronic problem. The neurological problem developed insidiously. The problem is unchanged. Associated symptoms include back pain. Pertinent negatives include no shortness of breath. Past treatments include bed rest and sleep. The treatment provided no relief. There is no history of a bleeding disorder, a clotting disorder, a CVA, dementia, head trauma, liver disease or seizures.             History obtained from  The   PATIENT         and other  available   medical  records   were  Also  reviewed.          The  Duration,  Quality,  Severity,  Location,  Timing,  Context,  Modifying  Factors   Of   The   Chief   Complaint       And  Present  Illness  Was   Reviewed   In   Chronological   [facility].                                                PATIENT'S  MAIN  CONCERNS INCLUDE :                     1)     H/O   CHRONIC  PERIPHERAL  POLYNEUROPATHY                                     BOTH LOWER  EXTREMITIES    FOR    5   YEARS                        2)      NO    H/O      DIABETES  MELLITUS                                  3)      PREVIOUS      H/O      ALCOHOL    ABUSE                                     QUIT  ALCOHOL   FOR   3   YEARS                          4)    H/O     CHRONIC  SMOKING                          PATIENT  AWARE  OF  RISKS  AND                      SIDE  EFFECTS  OF   SMOKING   DISCUSSED.                    PATIENT   ADVISED   AND  COUNSELED    TO   QUIT  SMOKING.                     5)      H/O   CHRONIC  ANXIETY,  DEPRESSION

## 2024-09-27 RX ORDER — MELOXICAM 15 MG/1
15 TABLET ORAL DAILY
Qty: 30 TABLET | Refills: 2 | Status: SHIPPED | OUTPATIENT
Start: 2024-09-27

## 2024-10-25 RX ORDER — GABAPENTIN 300 MG/1
CAPSULE ORAL
Qty: 90 CAPSULE | Refills: 0 | Status: SHIPPED | OUTPATIENT
Start: 2024-10-25 | End: 2025-01-24

## 2024-10-25 NOTE — TELEPHONE ENCOUNTER
Last Appt:  9/6/2024  Next Appt:   12/13/2024  Med verified in Epic     [facility] 10/25/24    Patient is requesting a refill of [facility]entin    [facility]

## 2024-12-13 ENCOUNTER — OFFICE VISIT (OUTPATIENT)
Dept: NEUROLOGY | Age: 64
End: 2024-12-13

## 2024-12-13 VITALS
HEART RATE: 74 BPM | WEIGHT: 230 LBS | SYSTOLIC BLOOD PRESSURE: 132 MMHG | OXYGEN SATURATION: 97 % | DIASTOLIC BLOOD PRESSURE: 80 MMHG | RESPIRATION RATE: 18 BRPM | BODY MASS INDEX: 31.19 KG/M2

## 2024-12-13 DIAGNOSIS — Z91.81 H/O FALL: ICD-10-CM

## 2024-12-13 DIAGNOSIS — E78.2 MIXED HYPERLIPIDEMIA: ICD-10-CM

## 2024-12-13 DIAGNOSIS — F41.8 DEPRESSION WITH ANXIETY: ICD-10-CM

## 2024-12-13 DIAGNOSIS — R20.0 NUMBNESS AND TINGLING OF BOTH LEGS BELOW KNEES: ICD-10-CM

## 2024-12-13 DIAGNOSIS — R20.2 NUMBNESS AND TINGLING OF BOTH LEGS BELOW KNEES: ICD-10-CM

## 2024-12-13 DIAGNOSIS — G62.1 ALCOHOLIC PERIPHERAL NEUROPATHY (HCC): ICD-10-CM

## 2024-12-13 DIAGNOSIS — M54.16 CHRONIC LUMBAR RADICULOPATHY: ICD-10-CM

## 2024-12-13 DIAGNOSIS — M79.2 NEUROPATHIC PAIN: ICD-10-CM

## 2024-12-13 DIAGNOSIS — I10 PRIMARY HYPERTENSION: ICD-10-CM

## 2024-12-13 DIAGNOSIS — R27.8 SENSORY ATAXIA: ICD-10-CM

## 2024-12-13 DIAGNOSIS — F17.200 SMOKER: ICD-10-CM

## 2024-12-13 DIAGNOSIS — G60.9 IDIOPATHIC PERIPHERAL NEUROPATHY: Primary | ICD-10-CM

## 2024-12-13 RX ORDER — MELOXICAM 15 MG/1
15 TABLET ORAL DAILY
Qty: 30 TABLET | Refills: 2 | Status: SHIPPED | OUTPATIENT
Start: 2024-12-13

## 2024-12-13 RX ORDER — UBIDECARENONE 75 MG
50 CAPSULE ORAL DAILY
COMMUNITY

## 2024-12-13 RX ORDER — OXCARBAZEPINE 150 MG/1
TABLET, FILM COATED ORAL
Qty: 120 TABLET | Refills: 1 | Status: SHIPPED | OUTPATIENT
Start: 2024-12-13

## 2024-12-13 RX ORDER — GABAPENTIN 300 MG/1
CAPSULE ORAL
Qty: 90 CAPSULE | Refills: 2 | Status: SHIPPED | OUTPATIENT
Start: 2024-12-13 | End: 2025-03-14

## 2024-12-13 RX ORDER — MULTIVIT WITH MINERALS/LUTEIN
250 TABLET ORAL DAILY
COMMUNITY

## 2024-12-13 ASSESSMENT — PATIENT HEALTH QUESTIONNAIRE - PHQ9
SUM OF ALL RESPONSES TO PHQ9 QUESTIONS 1 & 2: 0
SUM OF ALL RESPONSES TO PHQ QUESTIONS 1-9: 0
SUM OF ALL RESPONSES TO PHQ QUESTIONS 1-9: 0
1. LITTLE INTEREST OR PLEASURE IN DOING THINGS: NOT AT ALL
SUM OF ALL RESPONSES TO PHQ QUESTIONS 1-9: 0
SUM OF ALL RESPONSES TO PHQ QUESTIONS 1-9: 0
2. FEELING DOWN, DEPRESSED OR HOPELESS: NOT AT ALL

## 2024-12-13 ASSESSMENT — ENCOUNTER SYMPTOMS
VOICE CHANGE: 0
FACIAL SWELLING: 0
COUGH: 0
SINUS PRESSURE: 0
CHEST TIGHTNESS: 0
WHEEZING: 0
APNEA: 0
SORE THROAT: 0
CHOKING: 0
TROUBLE SWALLOWING: 0
BACK PAIN: 1
SHORTNESS OF BREATH: 0

## 2024-12-13 NOTE — PROGRESS NOTES
position change, stress,                weather change,   medications/alcohol, time of day/darkness/light  Are  present                                                          MODIFYING  FACTORS:  fever/infection, exertion/relaxation, position change, stress, weather change,               medications/alcohol, time of day/darkness/light  Are  present                Patient   Indicates   The  Presence   And  The  Absence  Of  The  Following    Associated  And             Additional  Neurological    Symptoms:                                Balance  And coordination   problems  present           Gait problems     absent            Headaches      absent              Migraines           absent           Memory problemsabsent              Confusion        absent            Paresthesia   numbness          absent           Seizures  And  Starring  Episodes           absent           Syncope,  Near  syncopal episodes         absent           Speech   problems           absent             Swallowing   Problems      absent            Dizziness,  Light headedness           absent              Vertigo        absent             Generalized   Weakness    absent              focal  Weakness     absent             Tremors         absent              Sleep  Problems     absent             History  Of   Recent  Head  Injury     absent             History  Of   Recent  TIA     absent             History  Of   Recent    Stroke     absent             Neck  Pain   and   Neck muscle  Spasms  absent               Radiating  down   And   Weakness           absent            Lower back   Pain  And     Spasms  present              Radiating    Down   And   Weakness          present                H/OFALLS        present               History  Of   Visual  Symptoms    absent                  Associated   Diplopia       absent                                               Also   Additional   Symptoms   Present    As  Documented    In   The

## 2025-03-21 ENCOUNTER — OFFICE VISIT (OUTPATIENT)
Dept: NEUROLOGY | Age: 65
End: 2025-03-21

## 2025-03-21 VITALS
HEIGHT: 72 IN | OXYGEN SATURATION: 95 % | HEART RATE: 74 BPM | BODY MASS INDEX: 32.37 KG/M2 | SYSTOLIC BLOOD PRESSURE: 130 MMHG | WEIGHT: 239 LBS | DIASTOLIC BLOOD PRESSURE: 80 MMHG | RESPIRATION RATE: 18 BRPM

## 2025-03-21 DIAGNOSIS — M79.2 NEUROPATHIC PAIN: ICD-10-CM

## 2025-03-21 DIAGNOSIS — Z91.81 H/O FALL: ICD-10-CM

## 2025-03-21 DIAGNOSIS — E78.2 MIXED HYPERLIPIDEMIA: ICD-10-CM

## 2025-03-21 DIAGNOSIS — G60.9 IDIOPATHIC PERIPHERAL NEUROPATHY: Primary | ICD-10-CM

## 2025-03-21 DIAGNOSIS — F17.200 SMOKER: ICD-10-CM

## 2025-03-21 DIAGNOSIS — G47.33 OSA (OBSTRUCTIVE SLEEP APNEA): ICD-10-CM

## 2025-03-21 DIAGNOSIS — F41.8 DEPRESSION WITH ANXIETY: ICD-10-CM

## 2025-03-21 DIAGNOSIS — I10 PRIMARY HYPERTENSION: ICD-10-CM

## 2025-03-21 DIAGNOSIS — R20.0 NUMBNESS AND TINGLING OF BOTH LEGS BELOW KNEES: ICD-10-CM

## 2025-03-21 DIAGNOSIS — M54.16 CHRONIC LUMBAR RADICULOPATHY: ICD-10-CM

## 2025-03-21 DIAGNOSIS — R20.2 NUMBNESS AND TINGLING OF BOTH LEGS BELOW KNEES: ICD-10-CM

## 2025-03-21 DIAGNOSIS — G62.1 ALCOHOLIC PERIPHERAL NEUROPATHY: ICD-10-CM

## 2025-03-21 DIAGNOSIS — R27.8 SENSORY ATAXIA: ICD-10-CM

## 2025-03-21 RX ORDER — OXCARBAZEPINE 150 MG/1
TABLET, FILM COATED ORAL
Qty: 120 TABLET | Refills: 1 | Status: SHIPPED | OUTPATIENT
Start: 2025-03-21

## 2025-03-21 RX ORDER — MELOXICAM 15 MG/1
15 TABLET ORAL DAILY
Qty: 30 TABLET | Refills: 2 | Status: SHIPPED | OUTPATIENT
Start: 2025-03-21

## 2025-03-21 RX ORDER — GABAPENTIN 300 MG/1
CAPSULE ORAL
Qty: 90 CAPSULE | Refills: 2 | Status: SHIPPED | OUTPATIENT
Start: 2025-03-21 | End: 2025-06-20

## 2025-03-21 ASSESSMENT — ENCOUNTER SYMPTOMS
COUGH: 0
SHORTNESS OF BREATH: 0
APNEA: 0
BACK PAIN: 1
SORE THROAT: 0
WHEEZING: 0
VOICE CHANGE: 0
TROUBLE SWALLOWING: 0
CHOKING: 0
FACIAL SWELLING: 0
SINUS PRESSURE: 0
CHEST TIGHTNESS: 0

## 2025-03-21 NOTE — PROGRESS NOTES
Exam  Constitutional:       Appearance: He is well-developed.   HENT:      Head: Normocephalic and atraumatic. No raccoon eyes or Reynoos's sign.      Right Ear: External ear normal.      Left Ear: External ear normal.      Nose: Nose normal.   Eyes:      Conjunctiva/sclera: Conjunctivae normal.      Pupils: Pupils are equal, round, and reactive to light.   Neck:      Thyroid: No thyroid mass or thyromegaly.      Vascular: No carotid bruit.      Trachea: No tracheal deviation.      Meningeal: Brudzinski's sign and Kernig's sign absent.   Cardiovascular:      Rate and Rhythm: Regular rhythm.   Pulmonary:      Effort: Pulmonary effort is normal.   Musculoskeletal:         General: No tenderness.      Cervical back: Normal range of motion and neck supple. No rigidity. No muscular tenderness. Normal range of motion.   Skin:     General: Skin is warm.      Coloration: Skin is not pale.      Findings: No erythema or rash.      Nails: There is no clubbing.   Psychiatric:         Attention and Perception: He is attentive.         Mood and Affect: Mood is anxious. Mood is not depressed. Affect is not labile, blunt or inappropriate.         Speech: He is communicative. Speech is not rapid and pressured, delayed, slurred or tangential.         Behavior: Behavior is not agitated, slowed, aggressive, withdrawn, hyperactive or combative. Behavior is cooperative.         Thought Content: Thought content is not paranoid or delusional. Thought content does not include homicidal or suicidal ideation. Thought content does not include homicidal or suicidal plan.         Cognition and Memory: Memory is not impaired. He does not exhibit impaired recent memory or impaired remote memory.         Judgment: Judgment is not impulsive or inappropriate.             NEUROLOGICALEXAMINATION :       A) MENTAL STATUS:                   Alert and  oriented  To time, place  And  Person.                  No Aphasia.  No  Dysarthria.

## 2025-06-23 ENCOUNTER — OFFICE VISIT (OUTPATIENT)
Dept: PRIMARY CARE CLINIC | Age: 65
End: 2025-06-23
Payer: COMMERCIAL

## 2025-06-23 VITALS
OXYGEN SATURATION: 98 % | DIASTOLIC BLOOD PRESSURE: 80 MMHG | SYSTOLIC BLOOD PRESSURE: 120 MMHG | WEIGHT: 240.2 LBS | HEART RATE: 81 BPM | BODY MASS INDEX: 32.58 KG/M2 | TEMPERATURE: 98.1 F

## 2025-06-23 DIAGNOSIS — H60.501 ACUTE OTITIS EXTERNA OF RIGHT EAR, UNSPECIFIED TYPE: Primary | ICD-10-CM

## 2025-06-23 DIAGNOSIS — H61.23 BILATERAL IMPACTED CERUMEN: ICD-10-CM

## 2025-06-23 DIAGNOSIS — H69.93 DISORDER OF BOTH EUSTACHIAN TUBES: ICD-10-CM

## 2025-06-23 PROCEDURE — 3079F DIAST BP 80-89 MM HG: CPT | Performed by: PHYSICIAN ASSISTANT

## 2025-06-23 PROCEDURE — 99213 OFFICE O/P EST LOW 20 MIN: CPT | Performed by: PHYSICIAN ASSISTANT

## 2025-06-23 PROCEDURE — 69209 REMOVE IMPACTED EAR WAX UNI: CPT | Performed by: PHYSICIAN ASSISTANT

## 2025-06-23 PROCEDURE — 3074F SYST BP LT 130 MM HG: CPT | Performed by: PHYSICIAN ASSISTANT

## 2025-06-23 RX ORDER — CIPROFLOXACIN AND DEXAMETHASONE 3; 1 MG/ML; MG/ML
4 SUSPENSION/ DROPS AURICULAR (OTIC) 2 TIMES DAILY
Qty: 30 ML | Refills: 0 | Status: SHIPPED | OUTPATIENT
Start: 2025-06-23

## 2025-06-23 RX ORDER — PREDNISONE 10 MG/1
10 TABLET ORAL 2 TIMES DAILY
Qty: 10 TABLET | Refills: 0 | Status: SHIPPED | OUTPATIENT
Start: 2025-06-23 | End: 2025-06-28

## 2025-06-23 ASSESSMENT — ENCOUNTER SYMPTOMS
NAUSEA: 0
SORE THROAT: 0
DIARRHEA: 0
VOMITING: 0
SHORTNESS OF BREATH: 0
RHINORRHEA: 0

## 2025-06-23 NOTE — PATIENT INSTRUCTIONS
Use antibiotic drop as prescribed for 7 days  Complete whole course of antibiotics  Take steroids as prescribed.  Take steroids in AM with food, do not take second dose too late in evening as it may disrupt sleep  May use allergy medication such as claritin or zyrtec  If symptoms worsen follow up with PCP or return to walk in clinic  Patient verbalized understanding and agrees with plan of care

## 2025-06-23 NOTE — PROGRESS NOTES
[facility]  [address]  [facility] [[email or web address]] [id number]  Dept: [phone]  Dept Fax: [phone]    Rakan Talbot is a 64 y.o. male who presents today for his medical conditions/complaints as noted below. Rakan Talbot is c/o of   Chief Complaint   Patient presents with    Ear Pain     Right ear pain left ear feels clogged   .    HPI:     Patient is a 65 yo male presenting today due to right ear pain and bilateral ear fullness. His symptoms began a couple days ago.     Ear Pain   There is pain in the right ear. This is a new problem. The current episode started in the past 7 days. The problem occurs constantly. The problem has been unchanged. There has been no fever. The pain is mild. Pertinent negatives include no diarrhea, headaches, rhinorrhea, sore throat or vomiting. He has tried ear drops for the symptoms.         Past Medical History:   Diagnosis Date    Adult situational stress disorder     Anger     Issues.    Depression with anxiety     History of tobacco abuse     Hyperlipidemia     Hypertension     YESICA (obstructive sleep apnea)     cpap noncompliace    Osteoarthritis     back and knees    Suicide attempt (HCC) Age 23     Past Surgical History:   Procedure Laterality Date    COLONOSCOPY  03/21/2013    With EGD with biopsies and polypectomies showing severe esophagitis with ulceration, gastritis with antral erosions, hiatal hernia and small rectal polyp.    EYE SURGERY Left     INGUINAL HERNIA REPAIR  06/16/2021    lap left ing [id number]    KNEE ARTHROSCOPY Left     Meniscus repair, [facility], Dr. Kim.    MEATOTOMY      For meatal stenosis.    UVULECTOMY      WISDOM TOOTH EXTRACTION         Family History   Problem Relation Age of Onset    Other Father         Paralyzed.    Heart Attack Father         Myocardial infarction.    Coronary Art Dis

## 2025-07-08 RX ORDER — MELOXICAM 15 MG/1
15 TABLET ORAL DAILY
Qty: 30 TABLET | Refills: 2 | OUTPATIENT
Start: 2025-07-08

## 2025-07-08 RX ORDER — MELOXICAM 15 MG/1
15 TABLET ORAL DAILY
Qty: 30 TABLET | Refills: 2 | Status: SHIPPED | OUTPATIENT
Start: 2025-07-08

## 2025-07-08 RX ORDER — MELOXICAM 15 MG/1
15 TABLET ORAL DAILY
Qty: 30 TABLET | Refills: 0 | OUTPATIENT
Start: 2025-07-08